# Patient Record
Sex: FEMALE | Race: BLACK OR AFRICAN AMERICAN | NOT HISPANIC OR LATINO
[De-identification: names, ages, dates, MRNs, and addresses within clinical notes are randomized per-mention and may not be internally consistent; named-entity substitution may affect disease eponyms.]

---

## 2017-09-26 ENCOUNTER — APPOINTMENT (OUTPATIENT)
Dept: MRI IMAGING | Facility: CLINIC | Age: 47
End: 2017-09-26

## 2017-09-30 ENCOUNTER — APPOINTMENT (OUTPATIENT)
Dept: MRI IMAGING | Facility: CLINIC | Age: 47
End: 2017-09-30

## 2017-09-30 ENCOUNTER — OUTPATIENT (OUTPATIENT)
Dept: OUTPATIENT SERVICES | Facility: HOSPITAL | Age: 47
LOS: 1 days | End: 2017-09-30
Payer: MEDICAID

## 2017-09-30 DIAGNOSIS — Z00.8 ENCOUNTER FOR OTHER GENERAL EXAMINATION: ICD-10-CM

## 2017-09-30 PROCEDURE — 73721 MRI JNT OF LWR EXTRE W/O DYE: CPT

## 2017-09-30 PROCEDURE — 73221 MRI JOINT UPR EXTREM W/O DYE: CPT | Mod: 26,LT

## 2017-09-30 PROCEDURE — 73221 MRI JOINT UPR EXTREM W/O DYE: CPT

## 2017-09-30 PROCEDURE — 73721 MRI JNT OF LWR EXTRE W/O DYE: CPT | Mod: 26,RT

## 2017-10-13 ENCOUNTER — OUTPATIENT (OUTPATIENT)
Dept: OUTPATIENT SERVICES | Facility: HOSPITAL | Age: 47
LOS: 1 days | End: 2017-10-13
Payer: MEDICAID

## 2017-10-13 DIAGNOSIS — M25.551 PAIN IN RIGHT HIP: ICD-10-CM

## 2017-10-13 PROCEDURE — 20610 DRAIN/INJ JOINT/BURSA W/O US: CPT | Mod: RT

## 2017-10-13 PROCEDURE — 77003 FLUOROGUIDE FOR SPINE INJECT: CPT

## 2018-03-20 ENCOUNTER — RESULT REVIEW (OUTPATIENT)
Age: 48
End: 2018-03-20

## 2018-04-11 ENCOUNTER — OTHER (OUTPATIENT)
Age: 48
End: 2018-04-11

## 2018-04-11 DIAGNOSIS — M25.559 PAIN IN UNSPECIFIED HIP: ICD-10-CM

## 2018-04-23 ENCOUNTER — APPOINTMENT (OUTPATIENT)
Dept: ORTHOPEDIC SURGERY | Facility: CLINIC | Age: 48
End: 2018-04-23
Payer: MEDICAID

## 2018-04-23 VITALS
SYSTOLIC BLOOD PRESSURE: 127 MMHG | WEIGHT: 140 LBS | HEART RATE: 61 BPM | HEIGHT: 66 IN | DIASTOLIC BLOOD PRESSURE: 86 MMHG | BODY MASS INDEX: 22.5 KG/M2

## 2018-04-23 PROCEDURE — 99204 OFFICE O/P NEW MOD 45 MIN: CPT

## 2018-04-23 PROCEDURE — 73502 X-RAY EXAM HIP UNI 2-3 VIEWS: CPT | Mod: RT

## 2018-08-13 ENCOUNTER — OTHER (OUTPATIENT)
Age: 48
End: 2018-08-13

## 2018-08-27 ENCOUNTER — APPOINTMENT (OUTPATIENT)
Dept: ORTHOPEDIC SURGERY | Facility: CLINIC | Age: 48
End: 2018-08-27
Payer: MEDICAID

## 2018-08-27 VITALS
BODY MASS INDEX: 22.5 KG/M2 | HEIGHT: 66 IN | SYSTOLIC BLOOD PRESSURE: 125 MMHG | DIASTOLIC BLOOD PRESSURE: 83 MMHG | HEART RATE: 64 BPM | WEIGHT: 140 LBS

## 2018-08-27 DIAGNOSIS — M70.71 OTHER BURSITIS OF HIP, RIGHT HIP: ICD-10-CM

## 2018-08-27 PROCEDURE — 99213 OFFICE O/P EST LOW 20 MIN: CPT

## 2018-08-27 PROCEDURE — 73502 X-RAY EXAM HIP UNI 2-3 VIEWS: CPT | Mod: RT

## 2018-11-09 ENCOUNTER — RECORD ABSTRACTING (OUTPATIENT)
Age: 48
End: 2018-11-09

## 2018-11-09 ENCOUNTER — APPOINTMENT (OUTPATIENT)
Dept: PHYSICAL MEDICINE AND REHAB | Facility: CLINIC | Age: 48
End: 2018-11-09
Payer: MEDICAID

## 2018-11-09 ENCOUNTER — APPOINTMENT (OUTPATIENT)
Dept: INTERNAL MEDICINE | Facility: CLINIC | Age: 48
End: 2018-11-09

## 2018-11-09 VITALS — BODY MASS INDEX: 22.5 KG/M2 | RESPIRATION RATE: 14 BRPM | WEIGHT: 140 LBS | HEIGHT: 66 IN | HEART RATE: 60 BPM

## 2018-11-09 PROBLEM — I87.8 VENOUS STASIS: Status: ACTIVE | Noted: 2018-11-09

## 2018-11-09 PROBLEM — I73.00 RAYNAUD'S PHENOMENON: Status: ACTIVE | Noted: 2018-11-09

## 2018-11-09 PROBLEM — M25.551 CHRONIC PAIN OF RIGHT HIP: Status: ACTIVE | Noted: 2018-11-09

## 2018-11-09 PROBLEM — R51 HEADACHE: Status: ACTIVE | Noted: 2018-11-09

## 2018-11-09 PROBLEM — M54.30 SCIATICA: Status: ACTIVE | Noted: 2018-11-09

## 2018-11-09 PROBLEM — M54.12 CERVICAL NEURALGIA: Status: RESOLVED | Noted: 2018-11-09 | Resolved: 2018-11-09

## 2018-11-09 PROCEDURE — 99204 OFFICE O/P NEW MOD 45 MIN: CPT

## 2018-11-14 ENCOUNTER — APPOINTMENT (OUTPATIENT)
Dept: INTERNAL MEDICINE | Facility: CLINIC | Age: 48
End: 2018-11-14
Payer: MEDICAID

## 2018-11-14 VITALS
SYSTOLIC BLOOD PRESSURE: 118 MMHG | DIASTOLIC BLOOD PRESSURE: 70 MMHG | HEIGHT: 66 IN | WEIGHT: 140 LBS | TEMPERATURE: 98 F | BODY MASS INDEX: 22.5 KG/M2

## 2018-11-14 DIAGNOSIS — M25.551 PAIN IN RIGHT HIP: ICD-10-CM

## 2018-11-14 DIAGNOSIS — T78.40XA ALLERGY, UNSPECIFIED, INITIAL ENCOUNTER: ICD-10-CM

## 2018-11-14 DIAGNOSIS — I87.8 OTHER SPECIFIED DISORDERS OF VEINS: ICD-10-CM

## 2018-11-14 DIAGNOSIS — M54.30 SCIATICA, UNSPECIFIED SIDE: ICD-10-CM

## 2018-11-14 DIAGNOSIS — R51 HEADACHE: ICD-10-CM

## 2018-11-14 DIAGNOSIS — G89.29 PAIN IN RIGHT HIP: ICD-10-CM

## 2018-11-14 DIAGNOSIS — I73.00 RAYNAUD'S SYNDROME W/OUT GANGRENE: ICD-10-CM

## 2018-11-14 DIAGNOSIS — M54.12 RADICULOPATHY, CERVICAL REGION: ICD-10-CM

## 2018-11-14 PROCEDURE — 99213 OFFICE O/P EST LOW 20 MIN: CPT

## 2018-11-14 NOTE — HISTORY OF PRESENT ILLNESS
[FreeTextEntry8] : Pt 47 y/o female present complaints of having rash all over her body for about a week.She went to urgent care and they gave her Allegra and hydroxyzine but she never picked up the pills. She reports itchy welts that appear and disappear on her arms and legs. She denies using any new products but does report increased stress. When she scratches her skin there is a welt there.

## 2018-11-14 NOTE — PLAN
[FreeTextEntry1] : She has evidence of urticaria and dermatographism. She was given Allegra and hydroxyzine. I encouraged her to  those pills and try them. If the Allegra is not covered by her insurance she can try Xyzal. Giving her a referral to an allergist.

## 2018-11-14 NOTE — PHYSICAL EXAM
[No Acute Distress] : no acute distress [Normal Sclera/Conjunctiva] : normal sclera/conjunctiva [Normal Outer Ear/Nose] : the outer ears and nose were normal in appearance [de-identified] : + dermatographism

## 2018-11-28 ENCOUNTER — MESSAGE (OUTPATIENT)
Age: 48
End: 2018-11-28

## 2018-12-14 ENCOUNTER — APPOINTMENT (OUTPATIENT)
Dept: PHYSICAL MEDICINE AND REHAB | Facility: CLINIC | Age: 48
End: 2018-12-14
Payer: MEDICAID

## 2018-12-14 VITALS
DIASTOLIC BLOOD PRESSURE: 88 MMHG | HEIGHT: 66 IN | SYSTOLIC BLOOD PRESSURE: 126 MMHG | BODY MASS INDEX: 22.5 KG/M2 | WEIGHT: 140 LBS

## 2018-12-14 DIAGNOSIS — M53.3 SACROCOCCYGEAL DISORDERS, NOT ELSEWHERE CLASSIFIED: ICD-10-CM

## 2018-12-14 DIAGNOSIS — M47.816 SPONDYLOSIS W/OUT MYELOPATHY OR RADICULOPATHY, LUMBAR REGION: ICD-10-CM

## 2018-12-14 PROCEDURE — 99214 OFFICE O/P EST MOD 30 MIN: CPT

## 2019-02-03 PROBLEM — T78.40XA ALLERGY: Status: ACTIVE | Noted: 2018-11-09

## 2019-03-12 ENCOUNTER — APPOINTMENT (OUTPATIENT)
Dept: INTERNAL MEDICINE | Facility: CLINIC | Age: 49
End: 2019-03-12
Payer: MEDICAID

## 2019-03-12 VITALS
HEIGHT: 65 IN | BODY MASS INDEX: 26.16 KG/M2 | WEIGHT: 157 LBS | TEMPERATURE: 98.3 F | SYSTOLIC BLOOD PRESSURE: 120 MMHG | DIASTOLIC BLOOD PRESSURE: 80 MMHG

## 2019-03-12 PROCEDURE — 99214 OFFICE O/P EST MOD 30 MIN: CPT

## 2019-03-12 RX ORDER — METHYLPREDNISOLONE 4 MG/1
4 TABLET ORAL
Qty: 1 | Refills: 0 | Status: COMPLETED | COMMUNITY
Start: 2018-12-14 | End: 2019-03-12

## 2019-03-12 RX ORDER — MELOXICAM 7.5 MG/1
7.5 TABLET ORAL TWICE DAILY
Qty: 60 | Refills: 0 | Status: COMPLETED | COMMUNITY
Start: 2018-04-23 | End: 2019-03-12

## 2019-03-12 NOTE — PLAN
[FreeTextEntry1] : Pt will follow a reflux diet and start medications above\par Discussed with her the need to start exercise to help with some of her stress. \par Follow up in 2 weeks if no improvement

## 2019-03-12 NOTE — REVIEW OF SYSTEMS
[Abdominal Pain] : no abdominal pain [Nausea] : nausea [Constipation] : no constipation [Diarrhea] : diarrhea [Vomiting] : no vomiting [Heartburn] : heartburn [Melena] : no melena

## 2019-03-12 NOTE — PHYSICAL EXAM
[Soft] : abdomen soft [Non Tender] : non-tender [Non-distended] : non-distended [No Masses] : no abdominal mass palpated [Normal Bowel Sounds] : normal bowel sounds [Normal Posterior Cervical Nodes] : no posterior cervical lymphadenopathy [Normal Anterior Cervical Nodes] : no anterior cervical lymphadenopathy [de-identified] : mild erythema oropharynx

## 2019-03-12 NOTE — HISTORY OF PRESENT ILLNESS
[FreeTextEntry8] : 47 y/o female present with acid reflux. \par Pt presents to the office today with exacerbation of reflux for the last 2-3 weeks.  She has improved diet and tried taking Nexium OTC for 8 days with no improvement\par No vomiting no bowel changes.\par She has been under increased stress with her son and his behavioral issues.

## 2019-04-08 ENCOUNTER — RX RENEWAL (OUTPATIENT)
Age: 49
End: 2019-04-08

## 2019-10-07 ENCOUNTER — NON-APPOINTMENT (OUTPATIENT)
Age: 49
End: 2019-10-07

## 2019-10-07 ENCOUNTER — APPOINTMENT (OUTPATIENT)
Dept: INTERNAL MEDICINE | Facility: CLINIC | Age: 49
End: 2019-10-07
Payer: MEDICAID

## 2019-10-07 VITALS
WEIGHT: 155 LBS | DIASTOLIC BLOOD PRESSURE: 80 MMHG | TEMPERATURE: 98.5 F | OXYGEN SATURATION: 98 % | HEIGHT: 65 IN | HEART RATE: 72 BPM | SYSTOLIC BLOOD PRESSURE: 120 MMHG | BODY MASS INDEX: 25.83 KG/M2

## 2019-10-07 PROCEDURE — 36415 COLL VENOUS BLD VENIPUNCTURE: CPT

## 2019-10-07 PROCEDURE — 93000 ELECTROCARDIOGRAM COMPLETE: CPT

## 2019-10-07 PROCEDURE — 99396 PREV VISIT EST AGE 40-64: CPT | Mod: 25

## 2019-10-07 RX ORDER — PANTOPRAZOLE 40 MG/1
40 TABLET, DELAYED RELEASE ORAL
Qty: 30 | Refills: 0 | Status: COMPLETED | COMMUNITY
Start: 2019-03-12 | End: 2019-10-07

## 2019-10-07 RX ORDER — BUTALBITAL, ACETAMINOPHEN AND CAFFEINE 325; 50; 40 MG/1; MG/1; MG/1
50-325-40 TABLET ORAL EVERY 4 HOURS
Refills: 0 | Status: COMPLETED | COMMUNITY
Start: 2018-11-09 | End: 2019-10-07

## 2019-10-07 RX ORDER — RANITIDINE HYDROCHLORIDE 300 MG/1
300 CAPSULE ORAL
Qty: 30 | Refills: 0 | Status: COMPLETED | COMMUNITY
Start: 2018-11-09 | End: 2019-10-07

## 2019-10-07 NOTE — HISTORY OF PRESENT ILLNESS
[FreeTextEntry1] : 48 y/o female present for a physical exam.  [de-identified] : Pt presents to the office today for CPE and FBW\par She feels well overall. \par She still suffers with back issues and she just recently started decompression therapy for her back to early to tell yet if its helping\par Her diet is good\par She suffers with intermittent reflux

## 2019-10-07 NOTE — PHYSICAL EXAM
[No Acute Distress] : no acute distress [Well Nourished] : well nourished [Well Developed] : well developed [Well-Appearing] : well-appearing [Normal Sclera/Conjunctiva] : normal sclera/conjunctiva [PERRL] : pupils equal round and reactive to light [EOMI] : extraocular movements intact [Normal Outer Ear/Nose] : the outer ears and nose were normal in appearance [Normal Oropharynx] : the oropharynx was normal [No JVD] : no jugular venous distention [No Lymphadenopathy] : no lymphadenopathy [Supple] : supple [Thyroid Normal, No Nodules] : the thyroid was normal and there were no nodules present [No Respiratory Distress] : no respiratory distress  [No Accessory Muscle Use] : no accessory muscle use [Clear to Auscultation] : lungs were clear to auscultation bilaterally [Normal Rate] : normal rate  [Regular Rhythm] : with a regular rhythm [Normal S1, S2] : normal S1 and S2 [No Murmur] : no murmur heard [No Carotid Bruits] : no carotid bruits [No Abdominal Bruit] : a ~M bruit was not heard ~T in the abdomen [No Varicosities] : no varicosities [Pedal Pulses Present] : the pedal pulses are present [No Edema] : there was no peripheral edema [No Palpable Aorta] : no palpable aorta [No Extremity Clubbing/Cyanosis] : no extremity clubbing/cyanosis [Soft] : abdomen soft [Non Tender] : non-tender [Non-distended] : non-distended [No Masses] : no abdominal mass palpated [No HSM] : no HSM [Normal Bowel Sounds] : normal bowel sounds [Normal Posterior Cervical Nodes] : no posterior cervical lymphadenopathy [Normal Anterior Cervical Nodes] : no anterior cervical lymphadenopathy [No CVA Tenderness] : no CVA  tenderness [No Spinal Tenderness] : no spinal tenderness [No Joint Swelling] : no joint swelling [Grossly Normal Strength/Tone] : grossly normal strength/tone [No Rash] : no rash [Coordination Grossly Intact] : coordination grossly intact [No Focal Deficits] : no focal deficits [Normal Gait] : normal gait [Deep Tendon Reflexes (DTR)] : deep tendon reflexes were 2+ and symmetric [Normal Affect] : the affect was normal [Normal Insight/Judgement] : insight and judgment were intact [de-identified] : BL trap spams and tightness

## 2019-10-07 NOTE — PLAN
[FreeTextEntry1] : Discussed reflux diet with pt.  Pt will elevated herself when sleeping with extra pillow.\par start ranitidine at bedtime\par She will continue to improve diet and exercise as tolerated with back pain\par She will continue with her back therapy and keep me posted how she is doing\par She is due for Mammo and rx was give to pt.\par FBW done in office today\par EKG done NSR @ 64 BPM

## 2019-10-08 ENCOUNTER — RESULT REVIEW (OUTPATIENT)
Age: 49
End: 2019-10-08

## 2019-10-08 LAB
25(OH)D3 SERPL-MCNC: 23.7 NG/ML
ALBUMIN SERPL ELPH-MCNC: 4.1 G/DL
ALP BLD-CCNC: 63 U/L
ALT SERPL-CCNC: 14 U/L
ANION GAP SERPL CALC-SCNC: 9 MMOL/L
APPEARANCE: CLEAR
AST SERPL-CCNC: 15 U/L
BACTERIA: NEGATIVE
BASOPHILS # BLD AUTO: 0.01 K/UL
BASOPHILS NFR BLD AUTO: 0.2 %
BILIRUB SERPL-MCNC: 0.5 MG/DL
BILIRUBIN URINE: NEGATIVE
BLOOD URINE: NEGATIVE
BUN SERPL-MCNC: 11 MG/DL
CALCIUM SERPL-MCNC: 9 MG/DL
CHLORIDE SERPL-SCNC: 105 MMOL/L
CHOLEST SERPL-MCNC: 168 MG/DL
CHOLEST/HDLC SERPL: 2.6 RATIO
CO2 SERPL-SCNC: 24 MMOL/L
COLOR: YELLOW
CREAT SERPL-MCNC: 0.98 MG/DL
EOSINOPHIL # BLD AUTO: 0.08 K/UL
EOSINOPHIL NFR BLD AUTO: 1.8 %
ESTIMATED AVERAGE GLUCOSE: 108 MG/DL
GLUCOSE QUALITATIVE U: NEGATIVE
GLUCOSE SERPL-MCNC: 91 MG/DL
HBA1C MFR BLD HPLC: 5.4 %
HCT VFR BLD CALC: 40.7 %
HDLC SERPL-MCNC: 65 MG/DL
HGB BLD-MCNC: 13.1 G/DL
HYALINE CASTS: 2 /LPF
IMM GRANULOCYTES NFR BLD AUTO: 0 %
KETONES URINE: NEGATIVE
LDLC SERPL CALC-MCNC: 93 MG/DL
LEUKOCYTE ESTERASE URINE: NEGATIVE
LYMPHOCYTES # BLD AUTO: 2.04 K/UL
LYMPHOCYTES NFR BLD AUTO: 45.9 %
MAN DIFF?: NORMAL
MCHC RBC-ENTMCNC: 30.4 PG
MCHC RBC-ENTMCNC: 32.2 GM/DL
MCV RBC AUTO: 94.4 FL
MICROSCOPIC-UA: NORMAL
MONOCYTES # BLD AUTO: 0.38 K/UL
MONOCYTES NFR BLD AUTO: 8.6 %
NEUTROPHILS # BLD AUTO: 1.93 K/UL
NEUTROPHILS NFR BLD AUTO: 43.5 %
NITRITE URINE: NEGATIVE
PH URINE: 6
PLATELET # BLD AUTO: 279 K/UL
POTASSIUM SERPL-SCNC: 4.3 MMOL/L
PROT SERPL-MCNC: 7.5 G/DL
PROTEIN URINE: NORMAL
RBC # BLD: 4.31 M/UL
RBC # FLD: 13.6 %
RED BLOOD CELLS URINE: 2 /HPF
SODIUM SERPL-SCNC: 137 MMOL/L
SPECIFIC GRAVITY URINE: 1.03
SQUAMOUS EPITHELIAL CELLS: 4 /HPF
TRIGL SERPL-MCNC: 49 MG/DL
TSH SERPL-ACNC: 1.63 UIU/ML
UROBILINOGEN URINE: NORMAL
VIT B12 SERPL-MCNC: 215 PG/ML
WBC # FLD AUTO: 4.44 K/UL
WHITE BLOOD CELLS URINE: 2 /HPF

## 2019-10-10 ENCOUNTER — APPOINTMENT (OUTPATIENT)
Dept: INTERNAL MEDICINE | Facility: CLINIC | Age: 49
End: 2019-10-10
Payer: MEDICAID

## 2019-10-10 PROCEDURE — 96372 THER/PROPH/DIAG INJ SC/IM: CPT

## 2019-10-10 RX ORDER — CYANOCOBALAMIN 1000 UG/ML
1000 INJECTION INTRAMUSCULAR; SUBCUTANEOUS
Qty: 0 | Refills: 0 | Status: COMPLETED | OUTPATIENT
Start: 2019-10-10

## 2019-10-10 RX ADMIN — CYANOCOBALAMIN 0 MCG/ML: 1000 INJECTION INTRAMUSCULAR; SUBCUTANEOUS at 00:00

## 2019-10-17 ENCOUNTER — APPOINTMENT (OUTPATIENT)
Dept: INTERNAL MEDICINE | Facility: CLINIC | Age: 49
End: 2019-10-17
Payer: MEDICAID

## 2019-10-17 ENCOUNTER — MED ADMIN CHARGE (OUTPATIENT)
Age: 49
End: 2019-10-17

## 2019-10-17 PROCEDURE — 96372 THER/PROPH/DIAG INJ SC/IM: CPT

## 2019-10-17 RX ORDER — CYANOCOBALAMIN 1000 UG/ML
1000 INJECTION INTRAMUSCULAR; SUBCUTANEOUS
Qty: 0 | Refills: 0 | Status: COMPLETED | OUTPATIENT
Start: 2019-10-17

## 2019-10-17 RX ADMIN — CYANOCOBALAMIN 0 MCG/ML: 1000 INJECTION INTRAMUSCULAR; SUBCUTANEOUS at 00:00

## 2019-10-24 ENCOUNTER — APPOINTMENT (OUTPATIENT)
Dept: INTERNAL MEDICINE | Facility: CLINIC | Age: 49
End: 2019-10-24
Payer: MEDICAID

## 2019-10-24 PROCEDURE — 96372 THER/PROPH/DIAG INJ SC/IM: CPT

## 2019-10-24 RX ORDER — CYANOCOBALAMIN 1000 UG/ML
1000 INJECTION INTRAMUSCULAR; SUBCUTANEOUS
Qty: 0 | Refills: 0 | Status: COMPLETED | OUTPATIENT
Start: 2019-10-24

## 2019-10-24 RX ADMIN — CYANOCOBALAMIN 0 MCG/ML: 1000 INJECTION INTRAMUSCULAR; SUBCUTANEOUS at 00:00

## 2019-10-31 ENCOUNTER — APPOINTMENT (OUTPATIENT)
Dept: INTERNAL MEDICINE | Facility: CLINIC | Age: 49
End: 2019-10-31
Payer: MEDICAID

## 2019-10-31 PROCEDURE — 36415 COLL VENOUS BLD VENIPUNCTURE: CPT

## 2019-11-01 ENCOUNTER — RECORD ABSTRACTING (OUTPATIENT)
Age: 49
End: 2019-11-01

## 2019-11-01 ENCOUNTER — RESULT REVIEW (OUTPATIENT)
Age: 49
End: 2019-11-01

## 2019-11-01 LAB — VIT B12 SERPL-MCNC: 732 PG/ML

## 2020-02-27 ENCOUNTER — APPOINTMENT (OUTPATIENT)
Dept: INTERNAL MEDICINE | Facility: CLINIC | Age: 50
End: 2020-02-27
Payer: MEDICAID

## 2020-02-27 VITALS
WEIGHT: 157 LBS | TEMPERATURE: 98.5 F | HEIGHT: 65 IN | HEART RATE: 69 BPM | BODY MASS INDEX: 26.16 KG/M2 | DIASTOLIC BLOOD PRESSURE: 80 MMHG | SYSTOLIC BLOOD PRESSURE: 120 MMHG | OXYGEN SATURATION: 97 %

## 2020-02-27 PROCEDURE — 99214 OFFICE O/P EST MOD 30 MIN: CPT

## 2020-02-27 NOTE — PLAN
[FreeTextEntry1] : Discussed reflux diet with pt.\par With out following a relux diet medications may not help\par Endoscopy is need and strict reflux diet along with medications

## 2020-02-27 NOTE — PHYSICAL EXAM
[Soft] : abdomen soft [de-identified] : Epigastric tenderness  [No Masses] : no abdominal mass palpated [Normal] : no posterior cervical lymphadenopathy and no anterior cervical lymphadenopathy

## 2020-02-27 NOTE — HISTORY OF PRESENT ILLNESS
[FreeTextEntry8] : 50 y/o female present with acid reflux, pt. states the meds are not helping her for the last few days\par Pt is drinking her coffee in the office.\par She has not been following a reflux diet

## 2020-02-28 RX ORDER — SUCRALFATE 1 G/10ML
1 SUSPENSION ORAL 3 TIMES DAILY
Qty: 1 | Refills: 0 | Status: DISCONTINUED | COMMUNITY
Start: 2020-02-27 | End: 2020-02-28

## 2020-04-06 ENCOUNTER — APPOINTMENT (OUTPATIENT)
Dept: GASTROENTEROLOGY | Facility: CLINIC | Age: 50
End: 2020-04-06

## 2020-04-09 PROBLEM — M25.559 HIP PAIN: Status: ACTIVE | Noted: 2018-04-11

## 2020-05-06 VITALS — BODY MASS INDEX: 25.33 KG/M2 | WEIGHT: 152 LBS | HEIGHT: 65 IN

## 2020-05-06 RX ORDER — RANITIDINE HYDROCHLORIDE 150 MG/1
150 CAPSULE ORAL TWICE DAILY
Qty: 60 | Refills: 0 | Status: DISCONTINUED | COMMUNITY
Start: 2019-03-12 | End: 2020-05-06

## 2020-05-06 RX ORDER — SUCRALFATE 1 G/10ML
1 SUSPENSION ORAL 3 TIMES DAILY
Qty: 1 | Refills: 0 | Status: DISCONTINUED | COMMUNITY
Start: 2020-02-28 | End: 2020-05-06

## 2020-05-06 RX ORDER — ADHESIVE TAPE 3"X 2.3 YD
50 MCG TAPE, NON-MEDICATED TOPICAL
Refills: 0 | Status: DISCONTINUED | COMMUNITY
Start: 2018-11-09 | End: 2020-05-06

## 2020-05-08 ENCOUNTER — APPOINTMENT (OUTPATIENT)
Dept: GASTROENTEROLOGY | Facility: HOSPITAL | Age: 50
End: 2020-05-08
Payer: MEDICAID

## 2020-05-08 PROCEDURE — 99203 OFFICE O/P NEW LOW 30 MIN: CPT | Mod: 95

## 2020-05-08 NOTE — HISTORY OF PRESENT ILLNESS
[de-identified] : 50 y/o American woman who presents with chief complaint of having acid reflux. The patient reports for many years she's been having acid reflux where she experiences regurgitation and burning in her throat. She has had hoarseness of voice.   At times she feels food get stuck in her chest. She is nauseous at times.  At times to feel better she will induce vomiting. \par \par She has tried acid suppressive therapy such as Nexium which have helped but it is temporary.\par \par She feels bloated and gassy. She has lost her appetite and lost about maybe 5 pounds since the past 5 months.\par \par Lately she also reports having constipation. She has a bowel movement every other day or every 3 days. Her stools can be hard. She denies any melena or hematochezia.\par \par She's never had an upper endoscopy or colonoscopy before.\par \par She denies any digestive cancers in her family.\par \par All other review of systems are negative.  Denies cardiac symptoms.\par \par

## 2020-05-08 NOTE — REASON FOR VISIT
[Home] : at home, [unfilled] , at the time of the visit. [Medical Office: ([facility])___] : at the medical office located in  [Patient] : the patient [FreeTextEntry2] : Itzel Wells

## 2020-05-08 NOTE — ASSESSMENT
[FreeTextEntry1] : IMPRESSION\par \par 1.  Acid reflux\par 2.  Dysphagia\par 3.  Loss of appetite, weight loss\par 4.  Bloating\par 5.  Constipation\par 6.  \par \par \par PLAN: \par 1.  I have advised her to schedule for an upper endoscopy under monitored anesthesia care to rule out esophagitis, stricture, lesion, hiatal hernia, peptic ulcer disease, H.pylori gastritis etc.   Risks, benefits, and alternatives of the procedure were discussed with the patient. Patient understands and agrees to schedule procedure.\par 2.  Continue PPI therapy for now - hold off 2 weeks prior to EGD.  Avoid zantac for now.\par 3.  I have advised the patient to arrange for a colonoscopy to rule out colon polyps, colorectal cancer etc. under monitored anesthesia care.  Risks such as perforation requiring surgery, bleeding, infection, diverticulitis, colitis, missed colon cancer (2% to 6%), internal organ injury, etc, risks of bowel prep including colitis, syncope, adverse reaction to medication etc. and risks of anesthesia including cardiopulmonary compromise were discussed with patient.  Patient verbalized understanding and agrees to schedule for planned procedure.\par 4.  Metamucil once a day with increased fluid hydration. \par \par \par

## 2020-05-18 ENCOUNTER — APPOINTMENT (OUTPATIENT)
Dept: GASTROENTEROLOGY | Facility: CLINIC | Age: 50
End: 2020-05-18

## 2020-06-15 ENCOUNTER — APPOINTMENT (OUTPATIENT)
Dept: DISASTER EMERGENCY | Facility: CLINIC | Age: 50
End: 2020-06-15

## 2020-06-16 LAB — SARS-COV-2 N GENE NPH QL NAA+PROBE: NOT DETECTED

## 2020-06-17 ENCOUNTER — TRANSCRIPTION ENCOUNTER (OUTPATIENT)
Age: 50
End: 2020-06-17

## 2020-06-18 ENCOUNTER — RESULT REVIEW (OUTPATIENT)
Age: 50
End: 2020-06-18

## 2020-06-18 ENCOUNTER — OUTPATIENT (OUTPATIENT)
Dept: OUTPATIENT SERVICES | Facility: HOSPITAL | Age: 50
LOS: 1 days | End: 2020-06-18
Payer: MEDICAID

## 2020-06-18 ENCOUNTER — APPOINTMENT (OUTPATIENT)
Dept: GASTROENTEROLOGY | Facility: HOSPITAL | Age: 50
End: 2020-06-18

## 2020-06-18 DIAGNOSIS — K21.9 GASTRO-ESOPHAGEAL REFLUX DISEASE WITHOUT ESOPHAGITIS: ICD-10-CM

## 2020-06-18 DIAGNOSIS — R13.10 DYSPHAGIA, UNSPECIFIED: ICD-10-CM

## 2020-06-18 DIAGNOSIS — K59.09 OTHER CONSTIPATION: ICD-10-CM

## 2020-06-18 LAB — HCG UR QL: NEGATIVE — SIGNIFICANT CHANGE UP

## 2020-06-18 PROCEDURE — 88312 SPECIAL STAINS GROUP 1: CPT

## 2020-06-18 PROCEDURE — 45385 COLONOSCOPY W/LESION REMOVAL: CPT

## 2020-06-18 PROCEDURE — 81025 URINE PREGNANCY TEST: CPT

## 2020-06-18 PROCEDURE — 43239 EGD BIOPSY SINGLE/MULTIPLE: CPT

## 2020-06-18 PROCEDURE — 88305 TISSUE EXAM BY PATHOLOGIST: CPT | Mod: 26

## 2020-06-18 PROCEDURE — 88305 TISSUE EXAM BY PATHOLOGIST: CPT

## 2020-06-18 PROCEDURE — 88312 SPECIAL STAINS GROUP 1: CPT | Mod: 26

## 2020-06-18 PROCEDURE — 88313 SPECIAL STAINS GROUP 2: CPT

## 2020-06-18 PROCEDURE — 88313 SPECIAL STAINS GROUP 2: CPT | Mod: 26

## 2020-06-18 PROCEDURE — C1889: CPT

## 2020-07-17 ENCOUNTER — APPOINTMENT (OUTPATIENT)
Dept: GASTROENTEROLOGY | Facility: CLINIC | Age: 50
End: 2020-07-17

## 2020-08-07 ENCOUNTER — APPOINTMENT (OUTPATIENT)
Dept: GASTROENTEROLOGY | Facility: CLINIC | Age: 50
End: 2020-08-07

## 2020-08-10 LAB — UREA BREATH TEST QL: NEGATIVE

## 2020-10-16 ENCOUNTER — NON-APPOINTMENT (OUTPATIENT)
Age: 50
End: 2020-10-16

## 2020-10-16 ENCOUNTER — APPOINTMENT (OUTPATIENT)
Dept: INTERNAL MEDICINE | Facility: CLINIC | Age: 50
End: 2020-10-16
Payer: MEDICAID

## 2020-10-16 VITALS
OXYGEN SATURATION: 99 % | SYSTOLIC BLOOD PRESSURE: 130 MMHG | BODY MASS INDEX: 25.99 KG/M2 | HEART RATE: 68 BPM | TEMPERATURE: 98.4 F | DIASTOLIC BLOOD PRESSURE: 80 MMHG | WEIGHT: 156 LBS | HEIGHT: 65 IN

## 2020-10-16 DIAGNOSIS — A04.8 OTHER SPECIFIED BACTERIAL INTESTINAL INFECTIONS: ICD-10-CM

## 2020-10-16 DIAGNOSIS — M62.838 OTHER MUSCLE SPASM: ICD-10-CM

## 2020-10-16 DIAGNOSIS — R94.31 ABNORMAL ELECTROCARDIOGRAM [ECG] [EKG]: ICD-10-CM

## 2020-10-16 DIAGNOSIS — R53.83 OTHER FATIGUE: ICD-10-CM

## 2020-10-16 PROCEDURE — G0008: CPT

## 2020-10-16 PROCEDURE — 36415 COLL VENOUS BLD VENIPUNCTURE: CPT

## 2020-10-16 PROCEDURE — 90686 IIV4 VACC NO PRSV 0.5 ML IM: CPT

## 2020-10-16 PROCEDURE — 99396 PREV VISIT EST AGE 40-64: CPT | Mod: 25

## 2020-10-16 RX ORDER — SUCRALFATE 1 G/10ML
1 SUSPENSION ORAL
Refills: 0 | Status: COMPLETED | COMMUNITY
End: 2020-10-16

## 2020-10-16 RX ORDER — RANITIDINE 150 MG/1
150 TABLET ORAL
Refills: 0 | Status: COMPLETED | COMMUNITY
End: 2020-10-16

## 2020-10-16 RX ORDER — CLARITHROMYCIN 500 MG/1
500 TABLET, FILM COATED ORAL TWICE DAILY
Qty: 28 | Refills: 0 | Status: COMPLETED | COMMUNITY
Start: 2020-06-24 | End: 2020-10-16

## 2020-10-16 RX ORDER — AMOXICILLIN 500 MG/1
500 TABLET, FILM COATED ORAL TWICE DAILY
Qty: 56 | Refills: 0 | Status: COMPLETED | COMMUNITY
Start: 2020-06-24 | End: 2020-10-16

## 2020-10-16 RX ORDER — FAMOTIDINE 40 MG/1
40 TABLET, FILM COATED ORAL
Qty: 30 | Refills: 0 | Status: COMPLETED | COMMUNITY
Start: 2020-07-07 | End: 2020-10-16

## 2020-10-16 RX ORDER — OMEPRAZOLE 40 MG/1
40 CAPSULE, DELAYED RELEASE ORAL
Qty: 30 | Refills: 3 | Status: COMPLETED | COMMUNITY
Start: 2020-06-18 | End: 2020-10-16

## 2020-10-16 RX ORDER — PANTOPRAZOLE 40 MG/1
40 TABLET, DELAYED RELEASE ORAL TWICE DAILY
Qty: 28 | Refills: 0 | Status: COMPLETED | COMMUNITY
Start: 2020-06-24 | End: 2020-10-16

## 2020-10-16 RX ORDER — METHYLPREDNISOLONE 4 MG/1
4 TABLET ORAL
Refills: 0 | Status: COMPLETED | COMMUNITY
End: 2020-10-16

## 2020-10-16 RX ORDER — ESOMEPRAZOLE MAGNESIUM 5 MG/1
GRANULE, DELAYED RELEASE ORAL
Refills: 0 | Status: COMPLETED | COMMUNITY
End: 2020-10-16

## 2020-10-16 NOTE — PLAN
[FreeTextEntry1] : Possible left atrial enlargement and prior infarct.  I would rather pt see cardiology for consult and echo vs going to a radiology center for this. Pt will make appointment with cardiology \par FBW done in office today\par Pt was given influenza vaccination today.  Tolerated vaccination well with no acute reactions. Discussed vaccination with pt and answered all questions.\par Pt will make appointment with pain management for possible epidural inj

## 2020-10-16 NOTE — HISTORY OF PRESENT ILLNESS
[de-identified] : Pt presents to the office today for CPE and FBW\par She has been with chronic back pain and has tried therapy with no improvement.   She has a hx of lumbar disc budges \par Diet has been doing better and she has been following a reflux diet

## 2020-10-16 NOTE — PHYSICAL EXAM
[Normal] : affect was normal and insight and judgment were intact [de-identified] : Lower back pain

## 2020-10-21 LAB
25(OH)D3 SERPL-MCNC: 47.8 NG/ML
ALBUMIN SERPL ELPH-MCNC: 3.9 G/DL
ALP BLD-CCNC: 60 U/L
ALT SERPL-CCNC: 9 U/L
ANION GAP SERPL CALC-SCNC: 9 MMOL/L
APPEARANCE: CLEAR
AST SERPL-CCNC: 18 U/L
BACTERIA: NEGATIVE
BASOPHILS # BLD AUTO: 0.01 K/UL
BASOPHILS NFR BLD AUTO: 0.2 %
BILIRUB SERPL-MCNC: 0.3 MG/DL
BILIRUBIN URINE: NEGATIVE
BLOOD URINE: NEGATIVE
BUN SERPL-MCNC: 6 MG/DL
CALCIUM SERPL-MCNC: 8.6 MG/DL
CHLORIDE SERPL-SCNC: 106 MMOL/L
CHOLEST SERPL-MCNC: 193 MG/DL
CHOLEST/HDLC SERPL: 2.3 RATIO
CO2 SERPL-SCNC: 23 MMOL/L
COLOR: NORMAL
CREAT SERPL-MCNC: 0.9 MG/DL
EOSINOPHIL # BLD AUTO: 0.07 K/UL
EOSINOPHIL NFR BLD AUTO: 1.7 %
ESTIMATED AVERAGE GLUCOSE: 111 MG/DL
FERRITIN SERPL-MCNC: 40 NG/ML
GLUCOSE QUALITATIVE U: NEGATIVE
GLUCOSE SERPL-MCNC: 92 MG/DL
HBA1C MFR BLD HPLC: 5.5 %
HCT VFR BLD CALC: 40 %
HDLC SERPL-MCNC: 85 MG/DL
HGB BLD-MCNC: 13 G/DL
HYALINE CASTS: 0 /LPF
IMM GRANULOCYTES NFR BLD AUTO: 0.2 %
IRON SATN MFR SERPL: 26 %
IRON SERPL-MCNC: 74 UG/DL
KETONES URINE: NEGATIVE
LDLC SERPL CALC-MCNC: 98 MG/DL
LEUKOCYTE ESTERASE URINE: NEGATIVE
LYMPHOCYTES # BLD AUTO: 2.4 K/UL
LYMPHOCYTES NFR BLD AUTO: 57.8 %
MAN DIFF?: NORMAL
MCHC RBC-ENTMCNC: 30.6 PG
MCHC RBC-ENTMCNC: 32.5 GM/DL
MCV RBC AUTO: 94.1 FL
MICROSCOPIC-UA: NORMAL
MONOCYTES # BLD AUTO: 0.37 K/UL
MONOCYTES NFR BLD AUTO: 8.9 %
NEUTROPHILS # BLD AUTO: 1.29 K/UL
NEUTROPHILS NFR BLD AUTO: 31.2 %
NITRITE URINE: NEGATIVE
PH URINE: 8
PLATELET # BLD AUTO: 276 K/UL
POTASSIUM SERPL-SCNC: 4.5 MMOL/L
PROT SERPL-MCNC: 7.5 G/DL
PROTEIN URINE: NEGATIVE
RBC # BLD: 4.25 M/UL
RBC # FLD: 13.9 %
RED BLOOD CELLS URINE: 2 /HPF
SODIUM SERPL-SCNC: 137 MMOL/L
SPECIFIC GRAVITY URINE: 1.01
SQUAMOUS EPITHELIAL CELLS: 3 /HPF
TIBC SERPL-MCNC: 286 UG/DL
TRIGL SERPL-MCNC: 48 MG/DL
TSH SERPL-ACNC: 0.99 UIU/ML
UIBC SERPL-MCNC: 212 UG/DL
UROBILINOGEN URINE: NORMAL
VIT B12 SERPL-MCNC: 249 PG/ML
WBC # FLD AUTO: 4.15 K/UL
WHITE BLOOD CELLS URINE: 0 /HPF

## 2020-11-02 ENCOUNTER — NON-APPOINTMENT (OUTPATIENT)
Age: 50
End: 2020-11-02

## 2020-11-03 ENCOUNTER — NON-APPOINTMENT (OUTPATIENT)
Age: 50
End: 2020-11-03

## 2020-11-03 DIAGNOSIS — K21.9 GASTRO-ESOPHAGEAL REFLUX DISEASE W/OUT ESOPHAGITIS: ICD-10-CM

## 2020-11-10 ENCOUNTER — RX RENEWAL (OUTPATIENT)
Age: 50
End: 2020-11-10

## 2020-11-16 ENCOUNTER — APPOINTMENT (OUTPATIENT)
Dept: INTERNAL MEDICINE | Facility: CLINIC | Age: 50
End: 2020-11-16
Payer: MEDICAID

## 2020-11-16 VITALS
OXYGEN SATURATION: 99 % | HEART RATE: 79 BPM | BODY MASS INDEX: 25.23 KG/M2 | TEMPERATURE: 98.4 F | SYSTOLIC BLOOD PRESSURE: 120 MMHG | WEIGHT: 157 LBS | HEIGHT: 66 IN | DIASTOLIC BLOOD PRESSURE: 80 MMHG

## 2020-11-16 VITALS — SYSTOLIC BLOOD PRESSURE: 120 MMHG | DIASTOLIC BLOOD PRESSURE: 80 MMHG

## 2020-11-16 LAB
BILIRUB UR QL STRIP: NEGATIVE
CLARITY UR: CLEAR
COLLECTION METHOD: NORMAL
GLUCOSE UR-MCNC: NEGATIVE
HCG UR QL: 0.2 EU/DL
HGB UR QL STRIP.AUTO: NORMAL
KETONES UR-MCNC: NEGATIVE
LEUKOCYTE ESTERASE UR QL STRIP: NORMAL
NITRITE UR QL STRIP: NEGATIVE
PH UR STRIP: 5.5
PROT UR STRIP-MCNC: NEGATIVE
SP GR UR STRIP: 1.03

## 2020-11-16 PROCEDURE — 81003 URINALYSIS AUTO W/O SCOPE: CPT | Mod: QW

## 2020-11-16 PROCEDURE — 99072 ADDL SUPL MATRL&STAF TM PHE: CPT

## 2020-11-16 PROCEDURE — 99214 OFFICE O/P EST MOD 30 MIN: CPT | Mod: 25

## 2020-11-16 NOTE — REVIEW OF SYSTEMS
[Dysuria] : dysuria [Negative] : Constitutional [Incontinence] : no incontinence [Nocturia] : no nocturia [Hematuria] : no hematuria [Frequency] : no frequency [Vaginal Discharge] : no vaginal discharge

## 2020-11-16 NOTE — HISTORY OF PRESENT ILLNESS
[FreeTextEntry8] : 49 y/o female present today with a possible UTI, pt states that shes been drinking cranberry juice \par She has been with intermittent burning with urination for the last 2 weeks.\par No flank pain no D/C no vaginal itch

## 2020-11-16 NOTE — PLAN
[FreeTextEntry1] : UA shows small blood and leuks.  With this and having dysuria pt will be started on Macrobid and will be advised of results once reviewed\par

## 2020-11-18 LAB — BACTERIA UR CULT: NORMAL

## 2020-11-19 ENCOUNTER — NON-APPOINTMENT (OUTPATIENT)
Age: 50
End: 2020-11-19

## 2020-11-24 DIAGNOSIS — Z83.3 FAMILY HISTORY OF DIABETES MELLITUS: ICD-10-CM

## 2020-11-24 DIAGNOSIS — Z82.49 FAMILY HISTORY OF ISCHEMIC HEART DISEASE AND OTHER DISEASES OF THE CIRCULATORY SYSTEM: ICD-10-CM

## 2020-11-24 RX ORDER — SODIUM SULFATE, POTASSIUM SULFATE, MAGNESIUM SULFATE 17.5; 3.13; 1.6 G/ML; G/ML; G/ML
17.5-3.13-1.6 SOLUTION, CONCENTRATE ORAL
Qty: 1 | Refills: 0 | Status: DISCONTINUED | COMMUNITY
Start: 2020-05-08 | End: 2020-11-24

## 2020-11-24 RX ORDER — PANTOPRAZOLE 40 MG/1
40 TABLET, DELAYED RELEASE ORAL TWICE DAILY
Qty: 60 | Refills: 3 | Status: DISCONTINUED | COMMUNITY
Start: 2020-11-03 | End: 2020-11-24

## 2020-11-24 RX ORDER — NITROFURANTOIN (MONOHYDRATE/MACROCRYSTALS) 25; 75 MG/1; MG/1
100 CAPSULE ORAL TWICE DAILY
Qty: 14 | Refills: 0 | Status: DISCONTINUED | COMMUNITY
Start: 2020-11-16 | End: 2020-11-24

## 2020-11-30 ENCOUNTER — NON-APPOINTMENT (OUTPATIENT)
Age: 50
End: 2020-11-30

## 2020-11-30 ENCOUNTER — APPOINTMENT (OUTPATIENT)
Dept: CARDIOLOGY | Facility: CLINIC | Age: 50
End: 2020-11-30
Payer: MEDICAID

## 2020-11-30 VITALS
SYSTOLIC BLOOD PRESSURE: 120 MMHG | WEIGHT: 157 LBS | HEIGHT: 66 IN | HEART RATE: 63 BPM | DIASTOLIC BLOOD PRESSURE: 82 MMHG | BODY MASS INDEX: 25.23 KG/M2 | OXYGEN SATURATION: 98 %

## 2020-11-30 PROCEDURE — 99072 ADDL SUPL MATRL&STAF TM PHE: CPT

## 2020-11-30 PROCEDURE — 93000 ELECTROCARDIOGRAM COMPLETE: CPT

## 2020-11-30 PROCEDURE — 99204 OFFICE O/P NEW MOD 45 MIN: CPT

## 2020-12-08 ENCOUNTER — APPOINTMENT (OUTPATIENT)
Dept: DISASTER EMERGENCY | Facility: CLINIC | Age: 50
End: 2020-12-08

## 2020-12-09 DIAGNOSIS — D12.6 BENIGN NEOPLASM OF COLON, UNSPECIFIED: ICD-10-CM

## 2020-12-10 ENCOUNTER — TRANSCRIPTION ENCOUNTER (OUTPATIENT)
Age: 50
End: 2020-12-10

## 2020-12-10 LAB — SARS-COV-2 N GENE NPH QL NAA+PROBE: NOT DETECTED

## 2020-12-11 ENCOUNTER — OUTPATIENT (OUTPATIENT)
Dept: OUTPATIENT SERVICES | Facility: HOSPITAL | Age: 50
LOS: 1 days | End: 2020-12-11
Payer: MEDICAID

## 2020-12-11 ENCOUNTER — APPOINTMENT (OUTPATIENT)
Dept: GASTROENTEROLOGY | Facility: HOSPITAL | Age: 50
End: 2020-12-11

## 2020-12-11 DIAGNOSIS — Z12.11 ENCOUNTER FOR SCREENING FOR MALIGNANT NEOPLASM OF COLON: ICD-10-CM

## 2020-12-11 LAB — HCG UR QL: NEGATIVE — SIGNIFICANT CHANGE UP

## 2020-12-11 PROCEDURE — 45378 DIAGNOSTIC COLONOSCOPY: CPT

## 2020-12-11 PROCEDURE — 81025 URINE PREGNANCY TEST: CPT

## 2020-12-15 ENCOUNTER — NON-APPOINTMENT (OUTPATIENT)
Age: 50
End: 2020-12-15

## 2020-12-18 ENCOUNTER — RX RENEWAL (OUTPATIENT)
Age: 50
End: 2020-12-18

## 2020-12-21 ENCOUNTER — APPOINTMENT (OUTPATIENT)
Dept: CARDIOLOGY | Facility: CLINIC | Age: 50
End: 2020-12-21
Payer: MEDICAID

## 2020-12-21 PROCEDURE — 99072 ADDL SUPL MATRL&STAF TM PHE: CPT

## 2020-12-21 PROCEDURE — 93306 TTE W/DOPPLER COMPLETE: CPT

## 2020-12-22 ENCOUNTER — NON-APPOINTMENT (OUTPATIENT)
Age: 50
End: 2020-12-22

## 2020-12-23 ENCOUNTER — OUTPATIENT (OUTPATIENT)
Dept: OUTPATIENT SERVICES | Facility: HOSPITAL | Age: 50
LOS: 1 days | End: 2020-12-23
Payer: MEDICAID

## 2020-12-23 ENCOUNTER — APPOINTMENT (OUTPATIENT)
Dept: CT IMAGING | Facility: CLINIC | Age: 50
End: 2020-12-23
Payer: MEDICAID

## 2020-12-23 DIAGNOSIS — I71.00 DISSECTION OF UNSPECIFIED SITE OF AORTA: ICD-10-CM

## 2020-12-23 PROCEDURE — 71275 CT ANGIOGRAPHY CHEST: CPT | Mod: 26

## 2020-12-23 PROCEDURE — 71275 CT ANGIOGRAPHY CHEST: CPT

## 2021-01-05 ENCOUNTER — APPOINTMENT (OUTPATIENT)
Dept: CT IMAGING | Facility: CLINIC | Age: 51
End: 2021-01-05

## 2021-01-14 ENCOUNTER — OUTPATIENT (OUTPATIENT)
Dept: OUTPATIENT SERVICES | Facility: HOSPITAL | Age: 51
LOS: 1 days | End: 2021-01-14
Payer: MEDICAID

## 2021-01-14 ENCOUNTER — APPOINTMENT (OUTPATIENT)
Dept: INTERNAL MEDICINE | Facility: CLINIC | Age: 51
End: 2021-01-14
Payer: MEDICAID

## 2021-01-14 ENCOUNTER — APPOINTMENT (OUTPATIENT)
Dept: CT IMAGING | Facility: CLINIC | Age: 51
End: 2021-01-14
Payer: MEDICAID

## 2021-01-14 VITALS
DIASTOLIC BLOOD PRESSURE: 78 MMHG | OXYGEN SATURATION: 96 % | HEART RATE: 77 BPM | HEIGHT: 66 IN | BODY MASS INDEX: 25.23 KG/M2 | SYSTOLIC BLOOD PRESSURE: 120 MMHG | TEMPERATURE: 98.4 F | WEIGHT: 157 LBS

## 2021-01-14 DIAGNOSIS — R94.31 ABNORMAL ELECTROCARDIOGRAM [ECG] [EKG]: ICD-10-CM

## 2021-01-14 DIAGNOSIS — M54.5 LOW BACK PAIN: ICD-10-CM

## 2021-01-14 PROCEDURE — 71275 CT ANGIOGRAPHY CHEST: CPT | Mod: 26

## 2021-01-14 PROCEDURE — 99072 ADDL SUPL MATRL&STAF TM PHE: CPT

## 2021-01-14 PROCEDURE — 99214 OFFICE O/P EST MOD 30 MIN: CPT

## 2021-01-14 PROCEDURE — 71275 CT ANGIOGRAPHY CHEST: CPT

## 2021-01-14 NOTE — PLAN
[FreeTextEntry1] : Patient went for react CT this morning and waiting on results\par Pending a cardiac CT she is holding off on getting her epidural\par She has not taken Pepcid given previously and has been having chronic heartburn\par She will start taking her Pepcid 40 mg at bedtime tonight and then take 20 mg in the a.m. and 20 mg in the p.m. for the next 3 to 4 weeks\par Once she gets results in from her CT she will call me and update me on her results

## 2021-01-14 NOTE — HISTORY OF PRESENT ILLNESS
[FreeTextEntry8] : 49 y/o female present today with lower back pain, Lt shoulder pain and stomach issues. \par She went for Chest CT this morning and waiting on results\par She is waiting to get epidural done for her LBP but waiting on Cardiac results first\par She has been with reflux since her colonoscopy.  She was given Pepcid but never took the medication.

## 2021-02-18 ENCOUNTER — APPOINTMENT (OUTPATIENT)
Dept: CARDIOLOGY | Facility: CLINIC | Age: 51
End: 2021-02-18
Payer: MEDICAID

## 2021-02-18 PROCEDURE — 93015 CV STRESS TEST SUPVJ I&R: CPT

## 2021-02-18 PROCEDURE — 99072 ADDL SUPL MATRL&STAF TM PHE: CPT

## 2021-02-23 ENCOUNTER — OUTPATIENT (OUTPATIENT)
Dept: OUTPATIENT SERVICES | Facility: HOSPITAL | Age: 51
LOS: 1 days | End: 2021-02-23
Payer: MEDICAID

## 2021-02-23 DIAGNOSIS — Z20.828 CONTACT WITH AND (SUSPECTED) EXPOSURE TO OTHER VIRAL COMMUNICABLE DISEASES: ICD-10-CM

## 2021-02-23 LAB — SARS-COV-2 RNA SPEC QL NAA+PROBE: SIGNIFICANT CHANGE UP

## 2021-02-23 PROCEDURE — U0005: CPT

## 2021-02-23 PROCEDURE — U0003: CPT

## 2021-02-25 ENCOUNTER — OUTPATIENT (OUTPATIENT)
Dept: OUTPATIENT SERVICES | Facility: HOSPITAL | Age: 51
LOS: 1 days | Discharge: HOME | End: 2021-02-25
Payer: MEDICAID

## 2021-02-25 DIAGNOSIS — M54.16 RADICULOPATHY, LUMBAR REGION: ICD-10-CM

## 2021-02-25 PROCEDURE — 62323 NJX INTERLAMINAR LMBR/SAC: CPT

## 2021-04-23 ENCOUNTER — APPOINTMENT (OUTPATIENT)
Dept: INTERNAL MEDICINE | Facility: CLINIC | Age: 51
End: 2021-04-23
Payer: MEDICAID

## 2021-04-23 VITALS
TEMPERATURE: 98.1 F | HEART RATE: 88 BPM | RESPIRATION RATE: 14 BRPM | DIASTOLIC BLOOD PRESSURE: 80 MMHG | SYSTOLIC BLOOD PRESSURE: 120 MMHG | OXYGEN SATURATION: 98 %

## 2021-04-23 DIAGNOSIS — Z03.818 ENCOUNTER FOR OBSERVATION FOR SUSPECTED EXPOSURE TO OTHER BIOLOGICAL AGENTS RULED OUT: ICD-10-CM

## 2021-04-23 PROCEDURE — 99213 OFFICE O/P EST LOW 20 MIN: CPT

## 2021-04-23 PROCEDURE — 99072 ADDL SUPL MATRL&STAF TM PHE: CPT

## 2021-04-23 NOTE — PHYSICAL EXAM
[Normal] : no posterior cervical lymphadenopathy and no anterior cervical lymphadenopathy [de-identified] : TM intact bilaterally no erythema bilateral bulging present

## 2021-04-23 NOTE — PLAN
[FreeTextEntry1] : Patient will start over-the-counter allergy medication and Tylenol every 4-6 hours as needed\par COVID PCR test done in office today patient will be advised results once reviewed

## 2021-04-23 NOTE — HISTORY OF PRESENT ILLNESS
Quality 431: Preventive Care And Screening: Unhealthy Alcohol Use - Screening: Patient screened for unhealthy alcohol use using a single question and scores less than 2 times per year
[FreeTextEntry8] : Presents the office today for Covid testing as she has had a mild headache for the last few weeks and her sons and  both tested positive within the last 10 days Covid\par She has slightly scratchy throat today and ear pressure
Quality 134: Screening For Clinical Depression And Follow-Up Plan: The patient was screened for depression and the screen was negative and no follow up required
Quality 110: Preventive Care And Screening: Influenza Immunization: Influenza Immunization previously received during influenza season
Quality 111:Pneumonia Vaccination Status For Older Adults: Pneumococcal Vaccination Previously Received
Detail Level: Detailed
Quality 131: Pain Assessment And Follow-Up: Pain assessment using a standardized tool is documented as negative, no follow-up plan required
Quality 226: Preventive Care And Screening: Tobacco Use: Screening And Cessation Intervention: Patient screened for tobacco and never smoked

## 2021-04-26 ENCOUNTER — NON-APPOINTMENT (OUTPATIENT)
Age: 51
End: 2021-04-26

## 2021-04-26 LAB — SARS-COV-2 N GENE NPH QL NAA+PROBE: DETECTED

## 2021-05-11 ENCOUNTER — NON-APPOINTMENT (OUTPATIENT)
Age: 51
End: 2021-05-11

## 2021-06-03 ENCOUNTER — APPOINTMENT (OUTPATIENT)
Dept: INTERNAL MEDICINE | Facility: CLINIC | Age: 51
End: 2021-06-03
Payer: MEDICAID

## 2021-06-03 VITALS
HEART RATE: 60 BPM | SYSTOLIC BLOOD PRESSURE: 118 MMHG | OXYGEN SATURATION: 99 % | DIASTOLIC BLOOD PRESSURE: 80 MMHG | WEIGHT: 165 LBS | TEMPERATURE: 97.7 F | BODY MASS INDEX: 26.52 KG/M2 | HEIGHT: 66 IN

## 2021-06-03 PROCEDURE — 99214 OFFICE O/P EST MOD 30 MIN: CPT

## 2021-06-03 PROCEDURE — 99072 ADDL SUPL MATRL&STAF TM PHE: CPT

## 2021-06-03 NOTE — HISTORY OF PRESENT ILLNESS
[FreeTextEntry1] : 50 y/o female presents today in the office for hip pain. [de-identified] : Pt presents to the office today with right upper thigh pain for the last few days.  She noticed a few lumps in her right upper thigh region.  Pain in leg is burning in nature and skin is very sensitive to touch.

## 2021-06-03 NOTE — PLAN
[FreeTextEntry1] : Pt appears to have possible shingles and post herpetic neuralgia \par Pt will start Valtrex and prednisone given above and follow up in 2 weeks if not subsided\par Pt is aware this may get slightly worse before getting better.   \par If significantly worsens pt will follow up with me right away

## 2021-06-03 NOTE — PHYSICAL EXAM
[Normal] : no joint swelling and grossly normal strength and tone [de-identified] : papules in crop formations right upper thigh.  Skin sensitivity to touch L2 and L4 dermatome regions .  No increased warmth no masses  no

## 2021-06-04 ENCOUNTER — APPOINTMENT (OUTPATIENT)
Dept: GASTROENTEROLOGY | Facility: CLINIC | Age: 51
End: 2021-06-04
Payer: MEDICAID

## 2021-06-04 DIAGNOSIS — K63.5 POLYP OF COLON: ICD-10-CM

## 2021-06-04 PROCEDURE — 99214 OFFICE O/P EST MOD 30 MIN: CPT

## 2021-06-04 PROCEDURE — 99072 ADDL SUPL MATRL&STAF TM PHE: CPT

## 2021-06-04 NOTE — ASSESSMENT
[FreeTextEntry1] : IMPRESSION: \par #  History of constipation - advised Metamucil previously \par -   Colonoscopy on June 2020: 1.3 cm semipedunculated polyp in the ascending colon s/p hot snare s/p single hemoclip (tubular adenoma). 3 mm transverse colon polyp removed by cold snare (tubular adenoma). 6 mm polyp in the sigmoid colon removed by cold snare (tubular adenoma). Repeat colonoscopy in 6 months. \par -  Colonoscopy 12/2020:  Small hemorrhoids - repeat colonoscopy in 3 years \par \par #  Chronic GERD\par -  EGD on June 2020: Normal esophagus. Gastric erythema s/p bx. Normal duodenum s/p bx. Gastric bx showed H. pylori gastritis. Distal esophageal bx showed reflux without BE. Biopsies were negative for celiac disease. \par -  S/p triple therapy with negative breath test 8/2020\par \par #  CT angio chest (Jan 2021) Normal exam.  s/p stress test and echo recently  \par \par \par \par \par PLAN \par 1.  Increase fluids with increased fibrous foods discussed.  Colonoscopy discussed however no alarming symptoms and done recently. \par 2.  MiraLAX is not working once day \par 3.  Abdominal ultrasound \par 4.  Follow up in 3 months

## 2021-06-04 NOTE — HISTORY OF PRESENT ILLNESS
[de-identified] : 50 y/o mother of 5, who presents with chief complaint of constipation. \par \par She has been has bowel movement every day - sometimes 3 times a day - but small [place] - does not feel empty after a bowel movement - pushes and strains to have a bowel movement.  Then will have abdominal pain after a bowel movement.  \par \par Says she has been constipated since May of 2020 but lately has been getting worse a month.  \par \par Sometimes she takes Metamucil - takes dairy - has used Dulcolax a few times but had abdominal pain and stopped taking.  \par \par No melena and no hematochezia - says she does not look at her stools. \par \par Lately she has been nauseas if she is hungry.  \par \par \par Herminio diagnosed yesterday - has not started prednisone, Valtrex yet - she will today. \par \par Offers no other complaint. \par Initial visit on May 2020: \par She has tried acid suppressive therapy such as Nexium which have helped but it is temporary.\par \par She feels bloated and gassy. She has lost her appetite and lost about maybe 5 pounds since the past 5 months.\par \par Lately she also reports having constipation. She has a bowel movement every other day or every 3 days. Her stools can be hard. She denies any melena or hematochezia.\par \par She's never had an upper endoscopy or colonoscopy before.\par \par She denies any digestive cancers in her family.\par \par EGD on June 2020: Normal esophagus.  Gastric erythema s/p bx.  Normal duodenum s/p bx.  Gastric bx showed H. pylori gastritis.  Distal esophageal bx showed reflux without BE.  Biopsies were negative for celiac disease.  \par \par Colonoscopy on June 2020:  1.3 cm semipedunculated polyp in the ascending colon s/p hot snare s/p single hemoclip (tubular adenoma).  3 mm transverse colon polyp removed by cold snare (tubular adenoma).  6 mm polyp in the sigmoid colon removed by cold snare (tubular adenoma).  Repeat colonoscopy in 6 months. \par \par \par Discussion/Summary 6/2020\par Called and reviewed recent biopsies - H. pylori. Large colon polyp tubular adenoma. \par Will start triple therapy. Side effects of abx reviewed. Patient will come for H. pylori breath test one month to confirm eradication - discussed how to prep for it.  Advised family should be tested for H. pylori.  Repeat colonoscopy in 6 months. \par \par Discussion/Summary 7/2020\par Called patient - she says she has been having more acid reflux, feels like she has a yeast infection and was feeling constipated. advised taking Pepcid in addition to PPI she is on, advised taking Monistat for yeast infection and stool softener for constipation. Call back if symptoms persist. She has two more days of triple of therapy. \par \par Discussion/Summary 8/2020\par Called and reviewed recent negative H. pylori - Advised Metamucil once a day for constipation. \par Colonoscopy in 6 months from June 2020. \par \par Discussion/Summary 9/2020\par Called patient - she reported having more acid reflux - now feeling better - advised Pepcid over the counter for reflux. She has no other complaints. Will see me for her colonoscopy given Hx of high risk adenoma. \par \par Discussion/Summary Nov 2020\par She says every time she eats she feels something in her throat - feels a lump in throat - advised pantoprazole 40 mg twice a day for 4 wks - she does not want an upper endoscopy - she will see me in office\par \par \par Colonoscopy on Dec 2020:  Mild internal hemorrhoids otherwise normal exam to the cecum.  Repeat colonoscopy in 3 years.  \par \par CT angio chest on Jan 2021:  Normal exam. \par \par \par Discussion/Summary 5/2021\par Called patient - she has been feeling constipated - no fevers, no abdominal pain pain - advised MiraLAX once a day - follow up in then office.

## 2021-06-04 NOTE — PHYSICAL EXAM
[General Appearance - Alert] : alert [General Appearance - In No Acute Distress] : in no acute distress [Sclera] : the sclera and conjunctiva were normal [Extraocular Movements] : extraocular movements were intact [Outer Ear] : the ears and nose were normal in appearance [Hearing Threshold Finger Rub Not [place]] : hearing was normal [Neck Appearance] : the appearance of the neck was normal [Neck Cervical Mass (___cm)] : no neck mass was observed [Auscultation Breath Sounds / Voice Sounds] : lungs were clear to auscultation bilaterally [Heart Rate And Rhythm] : heart rate was normal and rhythm regular [Heart Sounds] : normal S1 and S2 [Heart Sounds Gallop] : no gallops [Murmurs] : no murmurs [Heart Sounds Pericardial Friction Rub] : no pericardial rub [Bowel Sounds] : normal bowel sounds [Abdomen Soft] : soft [Abdomen Tenderness] : non-tender [Abdomen Mass (___ Cm)] : no abdominal mass palpated [Cervical Lymph Nodes Enlarged Posterior Bilaterally] : posterior cervical [Cervical Lymph Nodes Enlarged Anterior Bilaterally] : anterior cervical [Supraclavicular Lymph Nodes Enlarged Bilaterally] : supraclavicular [Abnormal Walk] : normal gait [Nail Clubbing] : no clubbing  or cyanosis of the fingernails [Skin Color & Pigmentation] : normal skin color and pigmentation [] : no rash [Oriented To Time, Place, And Person] : oriented to person, place, and time [Impaired Insight] : insight and judgment were intact [Affect] : the affect was normal

## 2021-06-07 ENCOUNTER — NON-APPOINTMENT (OUTPATIENT)
Age: 51
End: 2021-06-07

## 2021-06-07 LAB
ALBUMIN SERPL ELPH-MCNC: 3.8 G/DL
ALP BLD-CCNC: 63 U/L
ALT SERPL-CCNC: 15 U/L
AMYLASE/CREAT SERPL: 92 U/L
ANION GAP SERPL CALC-SCNC: 11 MMOL/L
AST SERPL-CCNC: 18 U/L
BASOPHILS # BLD AUTO: 0.02 K/UL
BASOPHILS NFR BLD AUTO: 0.6 %
BILIRUB SERPL-MCNC: 0.5 MG/DL
BUN SERPL-MCNC: 7 MG/DL
CALCIUM SERPL-MCNC: 8.9 MG/DL
CHLORIDE SERPL-SCNC: 102 MMOL/L
CO2 SERPL-SCNC: 23 MMOL/L
CREAT SERPL-MCNC: 0.96 MG/DL
EOSINOPHIL # BLD AUTO: 0.1 K/UL
EOSINOPHIL NFR BLD AUTO: 3 %
GLUCOSE SERPL-MCNC: 97 MG/DL
HCT VFR BLD CALC: 40 %
HGB BLD-MCNC: 12.7 G/DL
IMM GRANULOCYTES NFR BLD AUTO: 0.3 %
LPL SERPL-CCNC: 18 U/L
LYMPHOCYTES # BLD AUTO: 1.72 K/UL
LYMPHOCYTES NFR BLD AUTO: 51.3 %
MAN DIFF?: NORMAL
MCHC RBC-ENTMCNC: 30.1 PG
MCHC RBC-ENTMCNC: 31.8 GM/DL
MCV RBC AUTO: 94.8 FL
MONOCYTES # BLD AUTO: 0.34 K/UL
MONOCYTES NFR BLD AUTO: 10.1 %
NEUTROPHILS # BLD AUTO: 1.16 K/UL
NEUTROPHILS NFR BLD AUTO: 34.7 %
PLATELET # BLD AUTO: 286 K/UL
POTASSIUM SERPL-SCNC: 4.1 MMOL/L
PROT SERPL-MCNC: 7.7 G/DL
RBC # BLD: 4.22 M/UL
RBC # FLD: 13.2 %
SODIUM SERPL-SCNC: 136 MMOL/L
TSH SERPL-ACNC: 1.49 UIU/ML
WBC # FLD AUTO: 3.35 K/UL

## 2021-06-14 ENCOUNTER — APPOINTMENT (OUTPATIENT)
Dept: INTERNAL MEDICINE | Facility: CLINIC | Age: 51
End: 2021-06-14
Payer: COMMERCIAL

## 2021-06-14 VITALS
WEIGHT: 160 LBS | RESPIRATION RATE: 14 BRPM | HEART RATE: 73 BPM | BODY MASS INDEX: 25.71 KG/M2 | TEMPERATURE: 98 F | OXYGEN SATURATION: 99 % | SYSTOLIC BLOOD PRESSURE: 120 MMHG | DIASTOLIC BLOOD PRESSURE: 80 MMHG | HEIGHT: 66 IN

## 2021-06-14 DIAGNOSIS — D72.819 DECREASED WHITE BLOOD CELL COUNT, UNSPECIFIED: ICD-10-CM

## 2021-06-14 PROCEDURE — 99072 ADDL SUPL MATRL&STAF TM PHE: CPT

## 2021-06-14 PROCEDURE — 99214 OFFICE O/P EST MOD 30 MIN: CPT | Mod: 25

## 2021-06-14 PROCEDURE — 36415 COLL VENOUS BLD VENIPUNCTURE: CPT

## 2021-06-14 NOTE — HISTORY OF PRESENT ILLNESS
[FreeTextEntry1] : 50 y/o female presents in the office for a f/u visit. [de-identified] : Pt presents to the office today for repeat low WBC\par She is seeing GI and had abd US done for RUQ pain that radiates to her right shoulder and upper right back.  Increased nausea\par Worse with fatty fried foods\par US still pending as pt just had done today

## 2021-06-14 NOTE — PLAN
[FreeTextEntry1] : Pt will be advised of ABD US results once reviewed \par Repeat CBC done in office today as prior WBC was low \par Reflux diet discussed with pt \par Avoid fatty fried food

## 2021-06-14 NOTE — PHYSICAL EXAM
[Normal] : no acute distress, well nourished, well developed and well-appearing [No Masses] : no abdominal mass palpated [de-identified] : RUQ pain pos [place] sign

## 2021-06-22 ENCOUNTER — NON-APPOINTMENT (OUTPATIENT)
Age: 51
End: 2021-06-22

## 2021-06-22 LAB
BASOPHILS # BLD AUTO: 0.02 K/UL
BASOPHILS NFR BLD AUTO: 0.4 %
EOSINOPHIL # BLD AUTO: 0.07 K/UL
EOSINOPHIL NFR BLD AUTO: 1.2 %
HCT VFR BLD CALC: 41.2 %
HGB BLD-MCNC: 13.2 G/DL
IMM GRANULOCYTES NFR BLD AUTO: 0.2 %
LYMPHOCYTES # BLD AUTO: 2.56 K/UL
LYMPHOCYTES NFR BLD AUTO: 45.5 %
MAN DIFF?: NORMAL
MCHC RBC-ENTMCNC: 30.5 PG
MCHC RBC-ENTMCNC: 32 GM/DL
MCV RBC AUTO: 95.2 FL
MONOCYTES # BLD AUTO: 0.51 K/UL
MONOCYTES NFR BLD AUTO: 9.1 %
NEUTROPHILS # BLD AUTO: 2.46 K/UL
NEUTROPHILS NFR BLD AUTO: 43.6 %
PLATELET # BLD AUTO: 274 K/UL
RBC # BLD: 4.33 M/UL
RBC # FLD: 13.5 %
WBC # FLD AUTO: 5.63 K/UL

## 2021-09-29 ENCOUNTER — APPOINTMENT (OUTPATIENT)
Dept: INTERNAL MEDICINE | Facility: CLINIC | Age: 51
End: 2021-09-29
Payer: COMMERCIAL

## 2021-09-29 VITALS
TEMPERATURE: 98.6 F | BODY MASS INDEX: 25.71 KG/M2 | HEIGHT: 66 IN | SYSTOLIC BLOOD PRESSURE: 110 MMHG | WEIGHT: 160 LBS | HEART RATE: 64 BPM | DIASTOLIC BLOOD PRESSURE: 70 MMHG | OXYGEN SATURATION: 97 %

## 2021-09-29 DIAGNOSIS — Z23 ENCOUNTER FOR IMMUNIZATION: ICD-10-CM

## 2021-09-29 DIAGNOSIS — K21.9 GASTRO-ESOPHAGEAL REFLUX DISEASE W/OUT ESOPHAGITIS: ICD-10-CM

## 2021-09-29 DIAGNOSIS — I71.00 DISSECTION OF UNSPECIFIED SITE OF AORTA: ICD-10-CM

## 2021-09-29 DIAGNOSIS — M54.16 RADICULOPATHY, LUMBAR REGION: ICD-10-CM

## 2021-09-29 PROCEDURE — G0008: CPT

## 2021-09-29 PROCEDURE — 99214 OFFICE O/P EST MOD 30 MIN: CPT | Mod: 25

## 2021-09-29 PROCEDURE — 90686 IIV4 VACC NO PRSV 0.5 ML IM: CPT

## 2021-09-29 NOTE — HISTORY OF PRESENT ILLNESS
[de-identified] : Pt presents to the office today for follow up with reflux and back to right leg pain.\par She has been with reflux still.   She has not been following a reflux diet skips multiple meals day.  She only tried Pepcid for 1 week and stopped as she did not have improvement\par She is now with increased LBP radiating down her right leg\par She had epidural done at the beginning of the year and felt much better.  she has not gone back for any other epidurals

## 2021-09-29 NOTE — PLAN
[FreeTextEntry1] : Pt will call and make appointment with her pain management dr for follow up and to get another epidural\par She will restart Pepcid.  Follow  a reflux diet.  have 5-6 small meals per day\par Keep food diary \par Influenza vaccination given to pt left delt.  Tolerated well with no acute reactions

## 2021-10-12 ENCOUNTER — OUTPATIENT (OUTPATIENT)
Dept: OUTPATIENT SERVICES | Facility: HOSPITAL | Age: 51
LOS: 1 days | End: 2021-10-12
Payer: COMMERCIAL

## 2021-10-12 DIAGNOSIS — Z20.828 CONTACT WITH AND (SUSPECTED) EXPOSURE TO OTHER VIRAL COMMUNICABLE DISEASES: ICD-10-CM

## 2021-10-12 LAB — SARS-COV-2 RNA SPEC QL NAA+PROBE: SIGNIFICANT CHANGE UP

## 2021-10-12 PROCEDURE — U0003: CPT

## 2021-10-12 PROCEDURE — U0005: CPT

## 2021-10-13 ENCOUNTER — TRANSCRIPTION ENCOUNTER (OUTPATIENT)
Age: 51
End: 2021-10-13

## 2021-10-14 ENCOUNTER — OUTPATIENT (OUTPATIENT)
Dept: OUTPATIENT SERVICES | Facility: HOSPITAL | Age: 51
LOS: 1 days | End: 2021-10-14
Payer: COMMERCIAL

## 2021-10-14 DIAGNOSIS — M54.16 RADICULOPATHY, LUMBAR REGION: ICD-10-CM

## 2021-10-14 PROCEDURE — 62323 NJX INTERLAMINAR LMBR/SAC: CPT

## 2021-10-27 ENCOUNTER — RX CHANGE (OUTPATIENT)
Age: 51
End: 2021-10-27

## 2021-12-16 ENCOUNTER — NON-APPOINTMENT (OUTPATIENT)
Age: 51
End: 2021-12-16

## 2021-12-16 ENCOUNTER — APPOINTMENT (OUTPATIENT)
Dept: CARDIOLOGY | Facility: CLINIC | Age: 51
End: 2021-12-16
Payer: COMMERCIAL

## 2021-12-16 VITALS
SYSTOLIC BLOOD PRESSURE: 123 MMHG | HEIGHT: 65 IN | HEART RATE: 64 BPM | BODY MASS INDEX: 27.49 KG/M2 | WEIGHT: 165 LBS | DIASTOLIC BLOOD PRESSURE: 80 MMHG | OXYGEN SATURATION: 99 %

## 2021-12-16 PROCEDURE — 93000 ELECTROCARDIOGRAM COMPLETE: CPT

## 2021-12-16 PROCEDURE — 99214 OFFICE O/P EST MOD 30 MIN: CPT

## 2022-02-05 ENCOUNTER — TRANSCRIPTION ENCOUNTER (OUTPATIENT)
Age: 52
End: 2022-02-05

## 2022-03-24 ENCOUNTER — APPOINTMENT (OUTPATIENT)
Dept: INTERNAL MEDICINE | Facility: CLINIC | Age: 52
End: 2022-03-24
Payer: COMMERCIAL

## 2022-03-24 VITALS
OXYGEN SATURATION: 99 % | WEIGHT: 168 LBS | TEMPERATURE: 97.9 F | SYSTOLIC BLOOD PRESSURE: 138 MMHG | DIASTOLIC BLOOD PRESSURE: 78 MMHG | BODY MASS INDEX: 27.99 KG/M2 | HEIGHT: 65 IN | HEART RATE: 72 BPM

## 2022-03-24 PROCEDURE — 36415 COLL VENOUS BLD VENIPUNCTURE: CPT

## 2022-03-24 PROCEDURE — 96372 THER/PROPH/DIAG INJ SC/IM: CPT

## 2022-03-24 PROCEDURE — 99214 OFFICE O/P EST MOD 30 MIN: CPT | Mod: 25

## 2022-03-24 RX ORDER — CYANOCOBALAMIN 1000 UG/ML
1000 INJECTION INTRAMUSCULAR; SUBCUTANEOUS
Qty: 0 | Refills: 0 | Status: COMPLETED | OUTPATIENT
Start: 2022-03-24

## 2022-03-24 RX ADMIN — CYANOCOBALAMIN 1 MCG/ML: 1000 INJECTION INTRAMUSCULAR; SUBCUTANEOUS at 00:00

## 2022-03-24 NOTE — PLAN
[FreeTextEntry1] : Patient will start physical therapy for left shoulder further help with her decreased range of motion and pain\par Other recommend patient have ultrasensitive treatment done as well in the area\par Blood work done to check B12 level today for B12 injection given to patient right deltoid no acute reactions patient tolerated injection well\par Patient will be advised blood work results once reviewed

## 2022-03-24 NOTE — PHYSICAL EXAM
[de-identified] : Left medial blood work detailed tenderness with palpation decreased range of motion of the left shoulder

## 2022-03-24 NOTE — HISTORY OF PRESENT ILLNESS
[FreeTextEntry8] : 52 y/o female presents to the office today with LT shoulder pain from COVID booster and low energy.\par Patient COVID booster was given on June 27 and left out however as per patient injection was given very low in the shoulder and has been having pain ever since\par Patient suffers with B12 deficiency and has been feeling very fatigued and tired over the last several weeks

## 2022-04-05 LAB — VIT B12 SERPL-MCNC: 1486 PG/ML

## 2022-05-02 ENCOUNTER — APPOINTMENT (OUTPATIENT)
Dept: INTERNAL MEDICINE | Facility: CLINIC | Age: 52
End: 2022-05-02
Payer: COMMERCIAL

## 2022-05-02 VITALS
OXYGEN SATURATION: 98 % | WEIGHT: 168 LBS | SYSTOLIC BLOOD PRESSURE: 110 MMHG | HEART RATE: 70 BPM | TEMPERATURE: 98.3 F | HEIGHT: 65 IN | BODY MASS INDEX: 27.99 KG/M2 | DIASTOLIC BLOOD PRESSURE: 70 MMHG

## 2022-05-02 PROCEDURE — 99213 OFFICE O/P EST LOW 20 MIN: CPT

## 2022-05-02 NOTE — PHYSICAL EXAM
[Ill-Appearing] : ill-appearing [Normal Oropharynx] : the oropharynx was normal [Normal TMs] : both tympanic membranes were normal [No Lymphadenopathy] : no lymphadenopathy [Supple] : supple [Normal] : normal rate, regular rhythm, normal S1 and S2 and no murmur heard [No Edema] : there was no peripheral edema [Soft] : abdomen soft [Non Tender] : non-tender

## 2022-05-02 NOTE — HISTORY OF PRESENT ILLNESS
[FreeTextEntry8] : Patient comes for an acute visit. \par \par She has been sick for 6 days. Started last Tuesday. She p/w fever (up to 102F), chest congestion, cough, body aches, sore throat, runny nose, and headache. No cp or SOB. Her  brought home a viral illness. Her daughter also got sick, diagnosed with bronchitis. She tried Tylenol cold & flu and Nyquil but not helping. She does not feel she is getting better. Home COVID test was negative on Thursday. \par

## 2022-05-02 NOTE — PLAN
[FreeTextEntry1] : Check RVP w/ COVID PCR.\par Send for CXR r/o PNA.\par Start Clarithromycin 500 mg BID x 7 days.\par Start Promethazine-DM.\par Advised rest / fluids.\par Work excuse letter provided.\par Call with results.

## 2022-05-02 NOTE — REVIEW OF SYSTEMS
[Fever] : fever [Fatigue] : fatigue [Nasal Discharge] : nasal discharge [Sore Throat] : sore throat [Chest Pain] : no chest pain [Shortness Of Breath] : no shortness of breath [Wheezing] : no wheezing [Cough] : cough

## 2022-05-04 ENCOUNTER — APPOINTMENT (OUTPATIENT)
Dept: INTERNAL MEDICINE | Facility: CLINIC | Age: 52
End: 2022-05-04
Payer: COMMERCIAL

## 2022-05-04 VITALS
SYSTOLIC BLOOD PRESSURE: 110 MMHG | WEIGHT: 168 LBS | OXYGEN SATURATION: 98 % | DIASTOLIC BLOOD PRESSURE: 70 MMHG | HEART RATE: 70 BPM | HEIGHT: 65 IN | BODY MASS INDEX: 27.99 KG/M2

## 2022-05-04 DIAGNOSIS — T78.3XXA ANGIONEUROTIC EDEMA, INITIAL ENCOUNTER: ICD-10-CM

## 2022-05-04 LAB
HMPV RNA SPEC QL NAA+PROBE: DETECTED
RAPID RVP RESULT: DETECTED
SARS-COV-2 RNA PNL RESP NAA+PROBE: NOT DETECTED

## 2022-05-04 PROCEDURE — 99213 OFFICE O/P EST LOW 20 MIN: CPT

## 2022-05-04 RX ORDER — HALCINONIDE TOPICAL 1 MG/ML
0.1 SOLUTION TOPICAL
Qty: 120 | Refills: 0 | Status: DISCONTINUED | COMMUNITY
Start: 2022-02-12

## 2022-05-04 RX ORDER — FLUCONAZOLE 200 MG/1
200 TABLET ORAL
Qty: 12 | Refills: 0 | Status: DISCONTINUED | COMMUNITY
Start: 2022-02-16

## 2022-05-04 RX ORDER — PROMETHAZINE HYDROCHLORIDE AND DEXTROMETHORPHAN HYDROBROMIDE ORAL SOLUTION 15; 6.25 MG/5ML; MG/5ML
6.25-15 SOLUTION ORAL
Qty: 200 | Refills: 0 | Status: DISCONTINUED | COMMUNITY
Start: 2022-05-02 | End: 2022-05-04

## 2022-05-04 RX ORDER — FAMOTIDINE 20 MG/1
20 TABLET, FILM COATED ORAL TWICE DAILY
Qty: 60 | Refills: 2 | Status: DISCONTINUED | COMMUNITY
Start: 2021-09-29 | End: 2022-05-04

## 2022-05-04 RX ORDER — KETOCONAZOLE 20.5 MG/ML
2 SHAMPOO, SUSPENSION TOPICAL
Qty: 120 | Refills: 0 | Status: DISCONTINUED | COMMUNITY
Start: 2021-12-01

## 2022-05-04 RX ORDER — CLARITHROMYCIN 500 MG/1
500 TABLET, FILM COATED ORAL
Qty: 14 | Refills: 0 | Status: DISCONTINUED | COMMUNITY
Start: 2022-05-02 | End: 2022-05-04

## 2022-05-04 RX ORDER — TIZANIDINE 2 MG/1
2 TABLET ORAL
Qty: 42 | Refills: 0 | Status: DISCONTINUED | COMMUNITY
Start: 2021-12-01

## 2022-05-04 NOTE — PHYSICAL EXAM
[Normal Oropharynx] : the oropharynx was normal [Normal TMs] : both tympanic membranes were normal [No Lymphadenopathy] : no lymphadenopathy [Supple] : supple [Normal] : normal rate, regular rhythm, normal S1 and S2 and no murmur heard [No Edema] : there was no peripheral edema [Soft] : abdomen soft [Non Tender] : non-tender [No Acute Distress] : no acute distress [Well Nourished] : well nourished [de-identified] : swollen upper/lower lips, tongue normal size

## 2022-05-04 NOTE — PLAN
[FreeTextEntry1] : Suspect drug-induced angioedema.\par Stop both Clarithromycin and Promethazine-DM as suspected offending agent.\par Give Prednisone 40 mg x 1 today, Benadryl 25 mg x 1 tonight.\par If lips still swollen tomorrow, will give another dose of Prednisone 40 mg x 1. \par Pt to alert me tomorrow of any concerns or questions.

## 2022-05-04 NOTE — HISTORY OF PRESENT ILLNESS
[FreeTextEntry8] : Patient comes for an acute visit. \par \par She was seen 2 days ago in office for URI. RVP positive for MPV. CXR was negative for pneumonia. She was given Clarithromycin and Promethazine-DM. Yesterday morning, she suddenly developed swollen and painful lips. Tongue was not swollen. No difficulty breathing. \par

## 2022-05-11 DIAGNOSIS — L70.9 ACNE, UNSPECIFIED: ICD-10-CM

## 2022-05-11 DIAGNOSIS — L65.9 NONSCARRING HAIR LOSS, UNSPECIFIED: ICD-10-CM

## 2022-06-13 ENCOUNTER — APPOINTMENT (OUTPATIENT)
Dept: INTERNAL MEDICINE | Facility: CLINIC | Age: 52
End: 2022-06-13
Payer: COMMERCIAL

## 2022-06-13 VITALS
OXYGEN SATURATION: 99 % | TEMPERATURE: 98.5 F | HEIGHT: 65 IN | SYSTOLIC BLOOD PRESSURE: 116 MMHG | HEART RATE: 80 BPM | WEIGHT: 168 LBS | DIASTOLIC BLOOD PRESSURE: 70 MMHG | BODY MASS INDEX: 27.99 KG/M2

## 2022-06-13 PROCEDURE — 99213 OFFICE O/P EST LOW 20 MIN: CPT

## 2022-06-13 RX ORDER — PREDNISONE 20 MG/1
20 TABLET ORAL
Qty: 10 | Refills: 0 | Status: COMPLETED | COMMUNITY
Start: 2022-05-04 | End: 2022-06-13

## 2022-06-13 NOTE — PLAN
[FreeTextEntry1] : Patient will start Valtrex and prednisone as given above and follow-up in 1 week if not feeling better

## 2022-06-13 NOTE — HISTORY OF PRESENT ILLNESS
[FreeTextEntry8] : 53 y/o female presents to the office today with right lower back pain that radiates into the groin area patient has crops of vesicles again and hip region where she previously had shingles

## 2022-08-01 ENCOUNTER — APPOINTMENT (OUTPATIENT)
Dept: INTERNAL MEDICINE | Facility: CLINIC | Age: 52
End: 2022-08-01

## 2022-08-01 VITALS
OXYGEN SATURATION: 99 % | HEART RATE: 72 BPM | DIASTOLIC BLOOD PRESSURE: 88 MMHG | HEIGHT: 66 IN | SYSTOLIC BLOOD PRESSURE: 138 MMHG | BODY MASS INDEX: 27.64 KG/M2 | WEIGHT: 172 LBS | TEMPERATURE: 97.9 F

## 2022-08-01 DIAGNOSIS — E53.8 DEFICIENCY OF OTHER SPECIFIED B GROUP VITAMINS: ICD-10-CM

## 2022-08-01 DIAGNOSIS — E04.1 NONTOXIC SINGLE THYROID NODULE: ICD-10-CM

## 2022-08-01 DIAGNOSIS — E55.9 VITAMIN D DEFICIENCY, UNSPECIFIED: ICD-10-CM

## 2022-08-01 PROCEDURE — 36415 COLL VENOUS BLD VENIPUNCTURE: CPT

## 2022-08-01 PROCEDURE — 99396 PREV VISIT EST AGE 40-64: CPT | Mod: 25

## 2022-08-01 NOTE — PLAN
[FreeTextEntry1] : Fasting blood work done in office today\par Patient sees cardiologist routinely\par Patient advised me possible rheumatoid arthritis in the family and would like to get tested\par Patient will continue with daily walking and healthy diet\par Follow-up with her pain management\par Follow-up with gastro\par Patient due for thyroid ultrasound Rx given to patient

## 2022-08-01 NOTE — HISTORY OF PRESENT ILLNESS
[FreeTextEntry1] : 51 y/o female presents to the office today for a physical exam with fasting labs.  [de-identified] : Today for physical exam and fasting blood work\par She is feeling well overall with no new complaints at present time\par She is still suffers with back pain and sees pain management and is overdue for epidural injections\par Patient is walking daily and has a healthy diet\par Patient still suffers with reflux and going to gastro for evaluation

## 2022-08-01 NOTE — HEALTH RISK ASSESSMENT
[Good] : ~his/her~  mood as  good [No falls in past year] : Patient reported no falls in the past year [0] : 2) Feeling down, depressed, or hopeless: Not at all (0) [PHQ-2 Negative - No further assessment needed] : PHQ-2 Negative - No further assessment needed [[id number]] : 0 [Change in mental status noted] : No change in mental status noted [Language] : denies difficulty with language [Behavior] : denies difficulty with behavior [None] : None [With Significant Other] : lives with significant other [] :  [Sexually Active] : sexually active [High Risk Behavior] : no high risk behavior [Fully functional (bathing, dressing, toileting, transferring, walking, feeding)] : Fully functional (bathing, dressing, toileting, transferring, walking, feeding) [Fully functional (using the telephone, shopping, preparing meals, housekeeping, doing laundry, using] : Fully functional and needs no help or supervision to perform IADLs (using the telephone, shopping, preparing meals, housekeeping, doing laundry, using transportation, managing medications and managing finances) [Reports changes in hearing] : Reports no changes in hearing [Reports changes in vision] : Reports no changes in vision [Reports normal functional visual acuity (ie: able to read med bottle)] : Reports normal functional visual acuity

## 2022-08-15 ENCOUNTER — OUTPATIENT (OUTPATIENT)
Dept: OUTPATIENT SERVICES | Facility: HOSPITAL | Age: 52
LOS: 1 days | End: 2022-08-15
Payer: COMMERCIAL

## 2022-08-15 DIAGNOSIS — M54.16 RADICULOPATHY, LUMBAR REGION: ICD-10-CM

## 2022-08-15 PROCEDURE — 62323 NJX INTERLAMINAR LMBR/SAC: CPT

## 2022-08-18 ENCOUNTER — TRANSCRIPTION ENCOUNTER (OUTPATIENT)
Age: 52
End: 2022-08-18

## 2022-08-18 LAB
25(OH)D3 SERPL-MCNC: 45.8 NG/ML
ALBUMIN SERPL ELPH-MCNC: 4.2 G/DL
ALP BLD-CCNC: 67 U/L
ALT SERPL-CCNC: 12 U/L
ANION GAP SERPL CALC-SCNC: 9 MMOL/L
APPEARANCE: CLEAR
AST SERPL-CCNC: 18 U/L
BACTERIA: NEGATIVE
BASOPHILS # BLD AUTO: 0.02 K/UL
BASOPHILS NFR BLD AUTO: 0.4 %
BILIRUB SERPL-MCNC: 0.3 MG/DL
BILIRUBIN URINE: NEGATIVE
BLOOD URINE: NEGATIVE
BUN SERPL-MCNC: 10 MG/DL
CALCIUM SERPL-MCNC: 9.3 MG/DL
CCP AB SER IA-ACNC: <8 UNITS
CHLORIDE SERPL-SCNC: 104 MMOL/L
CHOLEST SERPL-MCNC: 208 MG/DL
CO2 SERPL-SCNC: 23 MMOL/L
COLOR: NORMAL
CREAT SERPL-MCNC: 0.95 MG/DL
EGFR: 73 ML/MIN/1.73M2
EOSINOPHIL # BLD AUTO: 0.07 K/UL
EOSINOPHIL NFR BLD AUTO: 1.5 %
ESTIMATED AVERAGE GLUCOSE: 120 MG/DL
GLUCOSE QUALITATIVE U: NEGATIVE
GLUCOSE SERPL-MCNC: 86 MG/DL
HBA1C MFR BLD HPLC: 5.8 %
HCT VFR BLD CALC: 39.1 %
HDLC SERPL-MCNC: 78 MG/DL
HGB BLD-MCNC: 12.7 G/DL
HYALINE CASTS: 0 /LPF
IMM GRANULOCYTES NFR BLD AUTO: 0.2 %
KETONES URINE: NEGATIVE
LDLC SERPL CALC-MCNC: 120 MG/DL
LEUKOCYTE ESTERASE URINE: NEGATIVE
LYMPHOCYTES # BLD AUTO: 2.58 K/UL
LYMPHOCYTES NFR BLD AUTO: 54.4 %
MAN DIFF?: NORMAL
MCHC RBC-ENTMCNC: 30.5 PG
MCHC RBC-ENTMCNC: 32.5 GM/DL
MCV RBC AUTO: 93.8 FL
MICROSCOPIC-UA: NORMAL
MONOCYTES # BLD AUTO: 0.43 K/UL
MONOCYTES NFR BLD AUTO: 9.1 %
NEUTROPHILS # BLD AUTO: 1.63 K/UL
NEUTROPHILS NFR BLD AUTO: 34.4 %
NITRITE URINE: NEGATIVE
NONHDLC SERPL-MCNC: 130 MG/DL
PH URINE: 6.5
PLATELET # BLD AUTO: 269 K/UL
POTASSIUM SERPL-SCNC: 4.3 MMOL/L
PROT SERPL-MCNC: 8 G/DL
PROTEIN URINE: NEGATIVE
RBC # BLD: 4.17 M/UL
RBC # FLD: 14.2 %
RED BLOOD CELLS URINE: 3 /HPF
RF+CCP IGG SER-IMP: NEGATIVE
RHEUMATOID FACT SER QL: <10 IU/ML
SODIUM SERPL-SCNC: 136 MMOL/L
SPECIFIC GRAVITY URINE: 1.01
SQUAMOUS EPITHELIAL CELLS: 1 /HPF
T3FREE SERPL-MCNC: 2.88 PG/ML
T4 FREE SERPL-MCNC: 1.2 NG/DL
TRIGL SERPL-MCNC: 51 MG/DL
TSH SERPL-ACNC: 1.33 UIU/ML
UROBILINOGEN URINE: NORMAL
VIT B12 SERPL-MCNC: 537 PG/ML
WBC # FLD AUTO: 4.74 K/UL
WHITE BLOOD CELLS URINE: 0 /HPF

## 2022-08-22 ENCOUNTER — APPOINTMENT (OUTPATIENT)
Dept: GASTROENTEROLOGY | Facility: CLINIC | Age: 52
End: 2022-08-22

## 2022-08-22 VITALS
WEIGHT: 172 LBS | HEIGHT: 66 IN | BODY MASS INDEX: 27.64 KG/M2 | OXYGEN SATURATION: 98 % | DIASTOLIC BLOOD PRESSURE: 70 MMHG | HEART RATE: 66 BPM | SYSTOLIC BLOOD PRESSURE: 120 MMHG | RESPIRATION RATE: 16 BRPM

## 2022-08-22 DIAGNOSIS — R10.13 EPIGASTRIC PAIN: ICD-10-CM

## 2022-08-22 DIAGNOSIS — K59.09 OTHER CONSTIPATION: ICD-10-CM

## 2022-08-22 DIAGNOSIS — K21.9 GASTRO-ESOPHAGEAL REFLUX DISEASE W/OUT ESOPHAGITIS: ICD-10-CM

## 2022-08-22 DIAGNOSIS — R09.89 OTHER SPECIFIED SYMPTOMS AND SIGNS INVOLVING THE CIRCULATORY AND RESPIRATORY SYSTEMS: ICD-10-CM

## 2022-08-22 PROCEDURE — 99204 OFFICE O/P NEW MOD 45 MIN: CPT

## 2022-08-22 RX ORDER — PREDNISONE 20 MG/1
20 TABLET ORAL
Qty: 10 | Refills: 1 | Status: DISCONTINUED | COMMUNITY
Start: 2022-06-13 | End: 2022-08-22

## 2022-08-22 RX ORDER — VALACYCLOVIR 1 G/1
1 TABLET, FILM COATED ORAL 3 TIMES DAILY
Qty: 21 | Refills: 1 | Status: DISCONTINUED | COMMUNITY
Start: 2022-06-13 | End: 2022-08-22

## 2022-08-22 NOTE — HISTORY OF PRESENT ILLNESS
[_________] : Performed [unfilled] [de-identified] : NITHYA PEDRAZA is a 52 year old female with PMH of Raynaud's, alopecia, and tubal ligation, presenting today for evaluation of chronic GERD and constipation. Former patient of Dr. Stokes at the [place] office. She complains of daily acid reflux, throat clearing, globus sensation, and dysphagia. Last EGD was in 2020 and was positive for H. pylori which she was subsequently treated for. She does admit to consuming a lot of citrus which aggravates her symptoms. Denies nausea, vomiting, weight loss, loss of appetite. Not currently on any PPI or H2 therapy. Has tried Omeprazole, Pantoprazole, Ranitidine, and Carafate in the past with minimal relief. \par \par She reports chronic constipation for the last 2-3 years. Currently having a bowel movement every 3 days. The stool is hard and pebble like. She has tried Metamucil in the past with no relief. Dulcolax helps but causes abdominal pain. Has not tried MiraLAX before. She denies black or bloody stools. Colonoscopy in 2020 was significant for 3 tubular adenomas, one being 1.3 cm. She is due for her next colonoscopy in 2023.

## 2022-08-22 NOTE — PHYSICAL EXAM
[General Appearance - Alert] : alert [General Appearance - In No Acute Distress] : in no acute distress [Sclera] : the sclera and conjunctiva were normal [PERRL With Normal Accommodation] : pupils were equal in size, round, and reactive to light [Extraocular Movements] : extraocular movements were intact [Outer Ear] : the ears and nose were normal in appearance [Oropharynx] : the oropharynx was normal [Neck Appearance] : the appearance of the neck was normal [Neck Cervical Mass (___cm)] : no neck mass was observed [Jugular Venous Distention Increased] : there was no jugular-venous distention [Thyroid Diffuse Enlargement] : the thyroid was not enlarged [Thyroid Nodule] : there were no palpable thyroid nodules [Auscultation Breath Sounds / Voice Sounds] : lungs were clear to auscultation bilaterally [Heart Rate And Rhythm] : heart rate was normal and rhythm regular [Heart Sounds] : normal S1 and S2 [Heart Sounds Gallop] : no gallops [Murmurs] : no murmurs [Heart Sounds Pericardial Friction Rub] : no pericardial rub [Bowel Sounds] : normal bowel sounds [Abdomen Soft] : soft [Abdomen Mass (___ Cm)] : no abdominal mass palpated [Epigastric] : in the epigastric area [Abnormal Walk] : normal gait [Nail Clubbing] : no clubbing  or cyanosis of the fingernails [Musculoskeletal - Swelling] : no joint swelling seen [Motor Tone] : muscle strength and tone were normal [Skin Color & Pigmentation] : normal skin color and pigmentation [Skin Turgor] : normal skin turgor [] : no rash [Oriented To Time, Place, And Person] : oriented to person, place, and time [Impaired Insight] : insight and judgment were intact [Affect] : the affect was normal

## 2022-08-22 NOTE — ASSESSMENT
[FreeTextEntry1] : 52 year old female presenting with complaints of worsening GERD and constipation. Mild epigastric tenderness present on physical exam. She will be scheduled for an upper endoscopy for further evaluation. I have discussed the indications (including but not limited to ruling out Hopson's esophagus, AVM's, H. pylori, and malignancies), benefits, risks (including but not limited to reaction to the anesthesia, infection, bleeding, and perforation), and alternatives to an endoscopy. The patient understands all options and has agreed to endoscopy and is medically optimized for the planned procedure. \par \par GERD diet provided\par Start Pantoprazole 40 mg QD\par Use Pepcid OTC PRN for breakthrough symptoms\par Today's instructions for acid reflux include avoiding provocative foods such as citrus, peppermint, fried foods, chocolate, alcohol and caffeine. Eat smaller more frequent meals and no eating within 2 hours of bedtime and elevate the head of the bed prior to sleep. \par \par Start MiraLAX 17 G daily for constipation and taper dose to effect\par Colonoscopy due in 2023

## 2022-09-13 ENCOUNTER — NON-APPOINTMENT (OUTPATIENT)
Age: 52
End: 2022-09-13

## 2022-10-30 ENCOUNTER — TRANSCRIPTION ENCOUNTER (OUTPATIENT)
Age: 52
End: 2022-10-30

## 2022-10-31 ENCOUNTER — RESULT REVIEW (OUTPATIENT)
Age: 52
End: 2022-10-31

## 2022-10-31 ENCOUNTER — APPOINTMENT (OUTPATIENT)
Dept: GASTROENTEROLOGY | Facility: GI CENTER | Age: 52
End: 2022-10-31

## 2022-10-31 ENCOUNTER — OUTPATIENT (OUTPATIENT)
Dept: OUTPATIENT SERVICES | Facility: HOSPITAL | Age: 52
LOS: 1 days | Discharge: HOME | End: 2022-10-31
Payer: COMMERCIAL

## 2022-10-31 DIAGNOSIS — K21.9 GASTRO-ESOPHAGEAL REFLUX DISEASE WITHOUT ESOPHAGITIS: ICD-10-CM

## 2022-10-31 DIAGNOSIS — R13.12 DYSPHAGIA, OROPHARYNGEAL PHASE: ICD-10-CM

## 2022-10-31 DIAGNOSIS — K21.9 GASTRO-ESOPHAGEAL REFLUX DISEASE W/OUT ESOPHAGITIS: ICD-10-CM

## 2022-10-31 PROCEDURE — 88342 IMHCHEM/IMCYTCHM 1ST ANTB: CPT

## 2022-10-31 PROCEDURE — 43239 EGD BIOPSY SINGLE/MULTIPLE: CPT

## 2022-10-31 PROCEDURE — 88305 TISSUE EXAM BY PATHOLOGIST: CPT | Mod: 26

## 2022-10-31 PROCEDURE — 88305 TISSUE EXAM BY PATHOLOGIST: CPT

## 2022-10-31 PROCEDURE — 88342 IMHCHEM/IMCYTCHM 1ST ANTB: CPT | Mod: 26

## 2022-10-31 NOTE — REVIEW OF SYSTEMS
[Heartburn] : heartburn [Negative] : Heme/Lymph [Abdominal Pain] : no abdominal pain [Vomiting] : no vomiting [Constipation] : no constipation [Diarrhea] : no diarrhea [Melena (black stool)] : no melena [Bleeding] : no bleeding [Fecal Incontinence (soiling)] : no fecal incontinence [Bloating (gassiness)] : no bloating [FreeTextEntry7] : Dysphagia

## 2022-10-31 NOTE — PHYSICAL EXAM
[Alert] : alert [Normal Voice/Communication] : normal voice/communication [Healthy Appearing] : healthy appearing [No Acute Distress] : no acute distress [Well Developed] : well developed [Well Nourished] : well nourished [Sclera] : the sclera and conjunctiva were normal [Hearing Threshold Finger Rub Not Prowers] : hearing was normal [Normal Lips/Gums] : the lips and gums were normal [Normal Appearance] : the appearance of the neck was normal [Oropharynx] : the oropharynx was normal [No Neck Mass] : no neck mass was observed [No Respiratory Distress] : no respiratory distress [No Acc Muscle Use] : no accessory muscle use [Respiration, Rhythm And Depth] : normal respiratory rhythm and effort [Auscultation Breath Sounds / Voice Sounds] : lungs were clear to auscultation bilaterally [Heart Rate And Rhythm] : heart rate was normal and rhythm regular [Normal S1, S2] : normal S1 and S2 [Murmurs] : no murmurs [Bowel Sounds] : normal bowel sounds [No Masses] : no abdominal mass palpated [Abdomen Tenderness] : non-tender [Abdomen Soft] : soft [] : no hepatosplenomegaly [Oriented To Time, Place, And Person] : oriented to person, place, and time

## 2022-11-04 LAB — SURGICAL PATHOLOGY STUDY: SIGNIFICANT CHANGE UP

## 2022-11-21 ENCOUNTER — APPOINTMENT (OUTPATIENT)
Dept: INTERNAL MEDICINE | Facility: CLINIC | Age: 52
End: 2022-11-21

## 2022-11-22 ENCOUNTER — APPOINTMENT (OUTPATIENT)
Dept: INTERNAL MEDICINE | Facility: CLINIC | Age: 52
End: 2022-11-22

## 2022-11-22 ENCOUNTER — NON-APPOINTMENT (OUTPATIENT)
Age: 52
End: 2022-11-22

## 2022-11-22 VITALS
BODY MASS INDEX: 27.16 KG/M2 | WEIGHT: 169 LBS | HEIGHT: 66 IN | OXYGEN SATURATION: 96 % | TEMPERATURE: 98 F | DIASTOLIC BLOOD PRESSURE: 78 MMHG | HEART RATE: 82 BPM | SYSTOLIC BLOOD PRESSURE: 126 MMHG

## 2022-11-22 DIAGNOSIS — R19.4 CHANGE IN BOWEL HABIT: ICD-10-CM

## 2022-11-22 DIAGNOSIS — R10.9 UNSPECIFIED ABDOMINAL PAIN: ICD-10-CM

## 2022-11-22 PROCEDURE — 99213 OFFICE O/P EST LOW 20 MIN: CPT

## 2022-11-22 NOTE — PHYSICAL EXAM
[Soft] : abdomen soft [Non-distended] : non-distended [No Masses] : no abdominal mass palpated [de-identified] : Mild LLQ and RLU supra pubic tenderness Increase bowel sounds

## 2022-11-22 NOTE — HISTORY OF PRESENT ILLNESS
[de-identified] : 52 year old female patient here at the office today for stomachaches for 1-2 weeks.\par Lower abd area cracking and intermittent pain with watery stool\par Daughter was sick with this 1 week ago and resolved \par No blood per rectum no mucous \par

## 2022-11-22 NOTE — REVIEW OF SYSTEMS
[Diarrhea] : diarrhea [Abdominal Pain] : no abdominal pain [Nausea] : no nausea [Constipation] : no constipation [Vomiting] : no vomiting [Heartburn] : no heartburn

## 2022-11-28 ENCOUNTER — TRANSCRIPTION ENCOUNTER (OUTPATIENT)
Age: 52
End: 2022-11-28

## 2022-11-28 ENCOUNTER — APPOINTMENT (OUTPATIENT)
Dept: INTERNAL MEDICINE | Facility: CLINIC | Age: 52
End: 2022-11-28

## 2022-11-28 VITALS
SYSTOLIC BLOOD PRESSURE: 108 MMHG | HEIGHT: 66 IN | HEART RATE: 51 BPM | DIASTOLIC BLOOD PRESSURE: 78 MMHG | WEIGHT: 169 LBS | BODY MASS INDEX: 27.16 KG/M2 | OXYGEN SATURATION: 95 % | TEMPERATURE: 99.9 F

## 2022-11-28 DIAGNOSIS — R09.89 OTHER SPECIFIED SYMPTOMS AND SIGNS INVOLVING THE CIRCULATORY AND RESPIRATORY SYSTEMS: ICD-10-CM

## 2022-11-28 DIAGNOSIS — R52 PAIN, UNSPECIFIED: ICD-10-CM

## 2022-11-28 DIAGNOSIS — U07.1 COVID-19: ICD-10-CM

## 2022-11-28 LAB
APPEARANCE: CLEAR
BACTERIA: NEGATIVE
BILIRUBIN URINE: NEGATIVE
BLOOD URINE: NEGATIVE
COLOR: YELLOW
FLUAV SPEC QL CULT: NEGATIVE
FLUBV AG SPEC QL IA: NEGATIVE
GLUCOSE QUALITATIVE U: NEGATIVE
HYALINE CASTS: 0 /LPF
KETONES URINE: NEGATIVE
LEUKOCYTE ESTERASE URINE: NEGATIVE
MICROSCOPIC-UA: NORMAL
NITRITE URINE: NEGATIVE
PH URINE: 8.5
PROTEIN URINE: ABNORMAL
RED BLOOD CELLS URINE: 5 /HPF
SARS-COV-2 AG RESP QL IA.RAPID: POSITIVE
SPECIFIC GRAVITY URINE: 1.02
SQUAMOUS EPITHELIAL CELLS: 3 /HPF
UROBILINOGEN URINE: NORMAL
WHITE BLOOD CELLS URINE: 2 /HPF

## 2022-11-28 PROCEDURE — 99214 OFFICE O/P EST MOD 30 MIN: CPT | Mod: 25

## 2022-11-28 PROCEDURE — 87804 INFLUENZA ASSAY W/OPTIC: CPT | Mod: QW

## 2022-11-28 PROCEDURE — 87811 SARS-COV-2 COVID19 W/OPTIC: CPT

## 2022-11-28 NOTE — PLAN
[FreeTextEntry1] : Rapid influenza negative\par COVID positive\par \par Pt will quarantine as per CDC and health department guidelines \par Pt will rest increase her fluids\par DayQuil as needed for symptom control.\par If any new or worsening symptoms pt will call or return to office \par \par Viral PCR still done as pt was exposed to Flu and Rapid was mild positive

## 2022-11-28 NOTE — REVIEW OF SYSTEMS
[Fever] : fever [Chills] : chills [Fatigue] : fatigue [Earache] : no earache [Hoarseness] : no hoarseness [Nasal Discharge] : nasal discharge [Sore Throat] : no sore throat [Shortness Of Breath] : no shortness of breath [Wheezing] : no wheezing [Cough] : cough [Dyspnea on Exertion] : no dyspnea on exertion [Muscle Pain] : muscle pain [Negative] : Cardiovascular

## 2022-11-28 NOTE — HISTORY OF PRESENT ILLNESS
[Cough] : cough [Cold Symptoms] : cold symptoms [FreeTextEntry8] : 52 year old female patient here at the office today for coughing,sneezing,runny nose, bodyaches mild HA and chest tightness since saturday . Tested negative for covid on saturday with a home test.\par Pt taking Tylenol and Theraflu

## 2022-12-01 LAB
BACTERIA STL CULT: NORMAL
BACTERIA UR CULT: NORMAL
DEPRECATED O AND P PREP STL: NORMAL
RAPID RVP RESULT: DETECTED
SARS-COV-2 RNA PNL RESP NAA+PROBE: DETECTED

## 2023-05-14 NOTE — HISTORY OF PRESENT ILLNESS
[FreeTextEntry1] : 53 y/o mother of 5, who presents with chief complaint of constipation. \par \par \par \par Initial visit on May 2020: \par She has tried acid suppressive therapy such as Nexium which have helped but it is temporary.\par She feels bloated and gassy. She has lost her appetite and lost about maybe 5 pounds since the past 5 months.\par Lately she also reports having constipation. She has a bowel movement every other day or every 3 days. Her stools can be hard. She denies any melena or hematochezia.\par She's never had an upper endoscopy or colonoscopy before.\par She denies any digestive cancers in her family.\par EGD on June 2020: Normal esophagus. Gastric erythema s/p bx. Normal duodenum s/p bx. Gastric bx showed H. pylori gastritis. Distal esophageal bx showed reflux without BE. Biopsies were negative for celiac disease. \par \par Colonoscopy on June 2020: 1.3 cm semipedunculated polyp in the ascending colon s/p hot snare s/p single hemoclip (tubular adenoma). 3 mm transverse colon polyp removed by cold snare (tubular adenoma). 6 mm polyp in the sigmoid colon removed by cold snare (tubular adenoma). Repeat colonoscopy in 6 months. \par \par \par Discussion/Summary 6/2020\par Called and reviewed recent biopsies - H. pylori. Large colon polyp tubular adenoma. \par Will start triple therapy. Side effects of abx reviewed. Patient will come for H. pylori breath test one month to confirm eradication - discussed how to prep for it. Advised family should be tested for H. pylori. Repeat colonoscopy in 6 months. \par \par Discussion/Summary 7/2020\par Called patient - she says she has been having more acid reflux, feels like she has a yeast infection and was feeling constipated. advised taking Pepcid in addition to PPI she is on, advised taking Monistat for yeast infection and stool softener for constipation. Call back if symptoms persist. She has two more days of triple of therapy. \par \par Discussion/Summary 8/2020\par Called and reviewed recent negative H. pylori - Advised Metamucil once a day for constipation. \par Colonoscopy in 6 months from June 2020. \par \par Discussion/Summary 9/2020\par Called patient - she reported having more acid reflux - now feeling better - advised Pepcid over the counter for reflux. She has no other complaints. Will see me for her colonoscopy given Hx of high risk adenoma. \par \par Discussion/Summary Nov 2020\par She says every time she eats she feels something in her throat - feels a lump in throat - advised pantoprazole 40 mg twice a day for 4 wks - she does not want an upper endoscopy - she will see me in office\par \par Colonoscopy on Dec 2020: Mild internal hemorrhoids otherwise normal exam to the cecum. Repeat colonoscopy in 3 years. CT angio chest on Jan 2021: Normal exam. \par \par Discussion/Summary 5/2021\par Called patient - she has been feeling constipated - no fevers, no abdominal pain pain - advised MiraLAX once a day - follow up in then office. \par  \par \par Office visit 6/2021: \par She has been has bowel movement every day - sometimes 3 times a day - but small [place] - does not feel empty after a bowel movement - pushes and strains to have a bowel movement. Then will have abdominal pain after a bowel movement. Says she has been constipated since May of 2020 but lately has been getting worse a month. \par Sometimes she takes Metamucil - takes dairy - has used Dulcolax a few times but had abdominal pain and stopped taking. No melena and no hematochezia - says she does not look at her stools. Lately she has been nauseas if she is hungry. Shingle diagnosed yesterday - has not started prednisone, Valtrex yet - she will today. Offers no other complaint.

## 2023-05-14 NOTE — ASSESSMENT
[FreeTextEntry1] : IMPRESSION: \par # History of constipation - tried Metamucil, MiraLAX in past \par -  Abdominal ultrasound in 6/2021:  Nml\par \par #  High risk adenoma  \par -  Colonoscopy by Dr. Stokes on 12/2020:  No polyps.  Good prep. Rpt in 3 years. \par -  Colonoscopy by Dr. Stokes on 5/2020: 1.3 cm semipedunculated polyp in the ascending colon s/p hot snare s/p single hemoclip (tubular adenoma). 3 mm transverse colon polyp removed by cold snare (tubular adenoma). 6 mm polyp in the sigmoid colon removed by cold snare (tubular adenoma).\par \par # Chronic GERD, H. pylori s/p successful treatment in 2020 - Pantoprazole twice a day in past \par -  EGD by Dr. Dr. Umana on 10/2022:  Nml exam s/p bx. Proximal, mid, distal esophageal bx with reactive changed consistent with reflux, neg for EoE.  Gastric bx neg for HP.\par - EGD by Dr. Stokes on 2020: Normal esophagus. Gastric erythema s/p bx. Normal duodenum s/p bx. Gastric bx showed H. pylori gastritis. Distal esophageal bx showed reflux without BE. Biopsies were negative for celiac disease. - S/p triple therapy with negative breath test 8/2020\par \par # CT angio chest (Jan 2021) Normal exam. s/p stress test and echo recently \par \par \par \par \par PLAN \par 1. Increase fluids with increased fibrous foods discussed. Colonoscopy discussed however no alarming symptoms and done recently. \par 2. MiraLAX is not working once day \par 3. Abdominal ultrasound \par 4. Follow up in 3 months. \par \par

## 2023-05-16 ENCOUNTER — APPOINTMENT (OUTPATIENT)
Dept: GASTROENTEROLOGY | Facility: CLINIC | Age: 53
End: 2023-05-16

## 2023-08-07 ENCOUNTER — APPOINTMENT (OUTPATIENT)
Dept: INTERNAL MEDICINE | Facility: CLINIC | Age: 53
End: 2023-08-07
Payer: COMMERCIAL

## 2023-08-07 ENCOUNTER — NON-APPOINTMENT (OUTPATIENT)
Age: 53
End: 2023-08-07

## 2023-08-07 VITALS
DIASTOLIC BLOOD PRESSURE: 80 MMHG | BODY MASS INDEX: 26.2 KG/M2 | OXYGEN SATURATION: 98 % | TEMPERATURE: 98.4 F | HEIGHT: 66 IN | SYSTOLIC BLOOD PRESSURE: 120 MMHG | WEIGHT: 163 LBS | HEART RATE: 59 BPM

## 2023-08-07 DIAGNOSIS — Z12.31 ENCOUNTER FOR SCREENING MAMMOGRAM FOR MALIGNANT NEOPLASM OF BREAST: ICD-10-CM

## 2023-08-07 DIAGNOSIS — Z00.00 ENCOUNTER FOR GENERAL ADULT MEDICAL EXAMINATION W/OUT ABNORMAL FINDINGS: ICD-10-CM

## 2023-08-07 DIAGNOSIS — Z13.6 ENCOUNTER FOR SCREENING FOR CARDIOVASCULAR DISORDERS: ICD-10-CM

## 2023-08-07 PROCEDURE — 93000 ELECTROCARDIOGRAM COMPLETE: CPT

## 2023-08-07 PROCEDURE — 99396 PREV VISIT EST AGE 40-64: CPT | Mod: 25

## 2023-08-07 PROCEDURE — 36415 COLL VENOUS BLD VENIPUNCTURE: CPT

## 2023-08-07 NOTE — PLAN
[FreeTextEntry1] : Pt will make appointment for mammo FBW done in office today  Follow up with ortho for right hip follow up Right hand Ortho for worker comp  EKG SR @ 60 BPM No changes.  Sees Cardio Dr Barnard

## 2023-08-07 NOTE — HEALTH RISK ASSESSMENT
[Good] : ~his/her~  mood as  good [No falls in past year] : Patient reported no falls in the past year [0] : 2) Feeling down, depressed, or hopeless: Not at all (0) [PHQ-2 Negative - No further assessment needed] : PHQ-2 Negative - No further assessment needed [None] : None [With Significant Other] : lives with significant other [Fully functional (bathing, dressing, toileting, transferring, walking, feeding)] : Fully functional (bathing, dressing, toileting, transferring, walking, feeding) [Fully functional (using the telephone, shopping, preparing meals, housekeeping, doing laundry, using] : Fully functional and needs no help or supervision to perform IADLs (using the telephone, shopping, preparing meals, housekeeping, doing laundry, using transportation, managing medications and managing finances) [Reports normal functional visual acuity (ie: able to read med bottle)] : Reports normal functional visual acuity [Never] : Never [FSO3Glive] : 0 [Change in mental status noted] : No change in mental status noted [Language] : denies difficulty with language [Behavior] : denies difficulty with behavior [Reports changes in hearing] : Reports no changes in hearing [Reports changes in vision] : Reports no changes in vision

## 2023-08-28 PROBLEM — Z13.6 ENCOUNTER FOR SCREENING FOR CARDIOVASCULAR DISORDERS: Status: ACTIVE | Noted: 2023-08-28

## 2023-09-11 ENCOUNTER — APPOINTMENT (OUTPATIENT)
Dept: ORTHOPEDIC SURGERY | Facility: CLINIC | Age: 53
End: 2023-09-11
Payer: OTHER MISCELLANEOUS

## 2023-09-11 VITALS — BODY MASS INDEX: 26.2 KG/M2 | HEIGHT: 66 IN | WEIGHT: 163 LBS

## 2023-09-11 DIAGNOSIS — S60.222A CONTUSION OF LEFT HAND, INITIAL ENCOUNTER: ICD-10-CM

## 2023-09-11 DIAGNOSIS — S60.221A CONTUSION OF RIGHT HAND, INITIAL ENCOUNTER: ICD-10-CM

## 2023-09-11 PROCEDURE — 73130 X-RAY EXAM OF HAND: CPT | Mod: RT

## 2023-09-11 PROCEDURE — 99203 OFFICE O/P NEW LOW 30 MIN: CPT

## 2023-09-28 ENCOUNTER — APPOINTMENT (OUTPATIENT)
Dept: MRI IMAGING | Facility: CLINIC | Age: 53
End: 2023-09-28

## 2024-03-22 PROBLEM — R73.09 ELEVATED HEMOGLOBIN A1C: Status: ACTIVE | Noted: 2018-11-09

## 2024-03-25 ENCOUNTER — APPOINTMENT (OUTPATIENT)
Dept: INTERNAL MEDICINE | Facility: CLINIC | Age: 54
End: 2024-03-25
Payer: COMMERCIAL

## 2024-03-25 VITALS
HEIGHT: 66 IN | DIASTOLIC BLOOD PRESSURE: 84 MMHG | HEART RATE: 79 BPM | BODY MASS INDEX: 27.64 KG/M2 | TEMPERATURE: 98 F | WEIGHT: 172 LBS | OXYGEN SATURATION: 98 % | SYSTOLIC BLOOD PRESSURE: 136 MMHG

## 2024-03-25 DIAGNOSIS — M25.50 PAIN IN UNSPECIFIED JOINT: ICD-10-CM

## 2024-03-25 DIAGNOSIS — M79.10 MYALGIA, UNSPECIFIED SITE: ICD-10-CM

## 2024-03-25 PROCEDURE — 99214 OFFICE O/P EST MOD 30 MIN: CPT

## 2024-03-25 RX ORDER — METHYLPREDNISOLONE 4 MG/1
4 TABLET ORAL
Qty: 1 | Refills: 0 | Status: COMPLETED | COMMUNITY
Start: 2023-09-11 | End: 2024-03-25

## 2024-03-25 NOTE — HISTORY OF PRESENT ILLNESS
[FreeTextEntry8] : Patient presents to the office today with right hip pain for the last few days.  Patient states she has been having multiple joint pains upper and lower extremities including muscle pain No traumas

## 2024-03-28 ENCOUNTER — APPOINTMENT (OUTPATIENT)
Dept: GASTROENTEROLOGY | Facility: CLINIC | Age: 54
End: 2024-03-28
Payer: COMMERCIAL

## 2024-03-28 VITALS
BODY MASS INDEX: 27.48 KG/M2 | HEART RATE: 73 BPM | RESPIRATION RATE: 18 BRPM | HEIGHT: 66 IN | WEIGHT: 171 LBS | DIASTOLIC BLOOD PRESSURE: 96 MMHG | SYSTOLIC BLOOD PRESSURE: 145 MMHG | OXYGEN SATURATION: 96 %

## 2024-03-28 DIAGNOSIS — Z12.12 ENCOUNTER FOR SCREENING FOR MALIGNANT NEOPLASM OF COLON: ICD-10-CM

## 2024-03-28 DIAGNOSIS — Z12.11 ENCOUNTER FOR SCREENING FOR MALIGNANT NEOPLASM OF COLON: ICD-10-CM

## 2024-03-28 DIAGNOSIS — R73.09 OTHER ABNORMAL GLUCOSE: ICD-10-CM

## 2024-03-28 PROCEDURE — 99214 OFFICE O/P EST MOD 30 MIN: CPT

## 2024-03-28 RX ORDER — PREDNISONE 50 MG/1
TABLET ORAL
Refills: 0 | Status: ACTIVE | COMMUNITY

## 2024-03-28 RX ORDER — SODIUM SULFATE, MAGNESIUM SULFATE, AND POTASSIUM CHLORIDE 17.75; 2.7; 2.25 G/1; G/1; G/1
1479-225-188 TABLET ORAL
Qty: 1 | Refills: 0 | Status: ACTIVE | COMMUNITY
Start: 2024-03-28 | End: 1900-01-01

## 2024-03-28 NOTE — ASSESSMENT
[FreeTextEntry1] : IMPRESSION: # History of three tubular adenomas one being -  - Colonoscopy 12/2020:  No colon polyps, rpt in 3 yrs.  - Colonoscopy on 6/2020: 1.3 cm semi-pedunculated polyp in the ascending colon s/p hot snare s/p single hemoclip (tubular adenoma). 3 mm transverse colon polyp removed by cold snare (tubular adenoma). 6 mm polyp in the sigmoid colon removed by cold snare (tubular adenoma).   #  Unproven GERD -  sometimes burning in chest or throat - says not as bad before.   -  EGD by Dr. Umana on 10/2022:  Nml esophagus s/p bx (neg for EoE, BE).  Nml gastric mucosa s/p bx (neg for HP and IM).  Nml duodenum.   - EGD by Dr. Stokes on June 2020: Normal esophagus. Gastric erythema s/p bx. Normal duodenum s/p bx. Gastric bx showed H. pylori gastritis. Distal esophageal bx showed reflux without BE. Biopsies were negative for celiac disease. - S/p triple therapy with negative breath test 8/2020     PLAN I have advised the patient to arrange for a colonoscopy to rule out colon polyps, colorectal cancer etc. under monitored anesthesia care.  Risks such as perforation requiring surgery, colostomy, bleeding requiring blood transfusion, infection/sepsis, diverticulitis, colitis, missed colon cancer (2% to 6%), internal organ injury such as splenic hemorrhage (1/6000, risk thin, female gender) etc, adverse reaction to medication etc. and risks of anesthesia including cardiopulmonary compromise requiring ICU care were discussed with patient.  Alternatives were discussed (CT colonography, stool test etc).  Patient verbalized understanding and agrees to proceed with a colonoscopy under anesthesia.  Risk factors for inadequate prep: -  Previous inadequate prep -  Previous colon resection -  Constipation drugs, chronic constipation -  DM, Obesity, stroke, spinal cord injury, Parkinson's  Give Dulcolax two to three nights (2 pills afternoon)  If ten tubular adenomas can consider genetic testing of now since large in 2020 - contact information given.

## 2024-03-28 NOTE — HISTORY OF PRESENT ILLNESS
[FreeTextEntry1] : 52 y/o mother of 5, who presents for a colonoscopy.   sometimes burning in chest or throat - says not as bad before.   Constipated at times - better than before.   Patient denies having abdominal pain, diarrhea, loss of appetite, weight loss, melena and hematochezia. All other review of systems are negative.  Denies cardiac symptoms.  Initial visit on May 2020: She has tried acid suppressive therapy such as Nexium which have helped but it is temporary.   She feels bloated and gassy. She has lost her appetite and lost about maybe 5 pounds since the past 5 months. Lately she also reports having constipation. She has a bowel movement every other day or every 3 days. Her stools can be hard. She denies any melena or hematochezia.  She's never had an upper endoscopy or colonoscopy before. She denies any digestive cancers in her family. EGD on June 2020: Normal esophagus. Gastric erythema s/p bx. Normal duodenum s/p bx. Gastric bx showed H. pylori gastritis. Distal esophageal bx showed reflux without BE. Biopsies were negative for celiac disease. Colonoscopy on June 2020: 1.3 cm semipedunculated polyp in the ascending colon s/p hot snare s/p single hemoclip (tubular adenoma). 3 mm transverse colon polyp removed by cold snare (tubular adenoma). 6 mm polyp in the sigmoid colon removed by cold snare (tubular adenoma). Repeat colonoscopy in 6 months.    Discussion/Summary 6/2020 Called and reviewed recent biopsies - H. pylori. Large colon polyp tubular adenoma. Will start triple therapy. Side effects of abx reviewed. Patient will come for H. pylori breath test one month to confirm eradication - discussed how to prep for it. Advised family should be tested for H. pylori. Repeat colonoscopy in 6 months.    Discussion/Summary 7/2020 Called patient - she says she has been having more acid reflux, feels like she has a yeast infection and was feeling constipated. advised taking Pepcid in addition to PPI she is on, advised taking Monistat for yeast infection and stool softener for constipation. Call back if symptoms persist. She has two more days of triple of therapy.   Discussion/Summary 8/2020 Called and reviewed recent negative H. pylori - Advised Metamucil once a day for constipation. Colonoscopy in 6 months from June 2020.   Discussion/Summary 9/2020 Called patient - she reported having more acid reflux - now feeling better - advised Pepcid over the counter for reflux. She has no other complaints. Will see me for her colonoscopy given Hx of high risk adenoma.   Discussion/Summary Nov 2020 She says every time she eats she feels something in her throat - feels a lump in throat - advised pantoprazole 40 mg twice a day for 4 wks - she does not want an upper endoscopy - she will see me in office Colonoscopy on Dec 2020: Mild internal hemorrhoids otherwise normal exam to the cecum. Repeat colonoscopy in 3 years. CT angio chest on Jan 2021: Normal exam.    Discussion/Summary 5/2021 Called patient - she has been feeling constipated - no fevers, no abdominal pain pain - advised MiraLAX once a day - follow up in then office.   Office visit 6/2021:  She has been has bowel movement every day - sometimes 3 times a day - but small [place] - does not feel empty after a bowel movement - pushes and strains to have a bowel movement. Then will have abdominal pain after a bowel movement. Says she has been constipated since May of 2020 but lately has been getting worse a month. Sometimes she takes Metamucil - takes dairy - has used Dulcolax a few times but had abdominal pain and stopped taking. No melena and no hematochezia - says she does not look at her stools. Lately she has been nauseas if she is hungry. [place]ingle diagnosed yesterday - has not started prednisone, Valtrex yet - she will today. Offers no other complaint.

## 2024-04-11 ENCOUNTER — APPOINTMENT (OUTPATIENT)
Dept: RHEUMATOLOGY | Facility: CLINIC | Age: 54
End: 2024-04-11
Payer: COMMERCIAL

## 2024-04-11 ENCOUNTER — LABORATORY RESULT (OUTPATIENT)
Age: 54
End: 2024-04-11

## 2024-04-11 VITALS
OXYGEN SATURATION: 97 % | DIASTOLIC BLOOD PRESSURE: 80 MMHG | WEIGHT: 172 LBS | BODY MASS INDEX: 27.64 KG/M2 | HEART RATE: 71 BPM | SYSTOLIC BLOOD PRESSURE: 120 MMHG | HEIGHT: 66 IN | TEMPERATURE: 97.3 F

## 2024-04-11 DIAGNOSIS — M25.50 PAIN IN UNSPECIFIED JOINT: ICD-10-CM

## 2024-04-11 DIAGNOSIS — M54.9 DORSALGIA, UNSPECIFIED: ICD-10-CM

## 2024-04-11 PROCEDURE — 36415 COLL VENOUS BLD VENIPUNCTURE: CPT

## 2024-04-11 PROCEDURE — 99204 OFFICE O/P NEW MOD 45 MIN: CPT

## 2024-04-11 NOTE — REVIEW OF SYSTEMS
[Recent Weight Gain (___ Lbs)] : recent [unfilled] ~Ulb weight gain [Recent Weight Loss (___ Lbs)] : no recent weight loss [As Noted in HPI] : as noted in HPI [Arthralgias] : arthralgias [Joint Pain] : joint pain [Joint Swelling] : no joint swelling [Joint Stiffness] : joint stiffness [Negative] : Heme/Lymph

## 2024-04-11 NOTE — PHYSICAL EXAM
[General Appearance - Alert] : alert [General Appearance - In No Acute Distress] : in no acute distress [General Appearance - Well Developed] : well developed [General Appearance - Well-Appearing] : healthy appearing [Sclera] : the sclera and conjunctiva were normal [Extraocular Movements] : extraocular movements were intact [Outer Ear] : the ears and nose were normal in appearance [Exaggerated Use Of Accessory Muscles For Inspiration] : no accessory muscle use [Edema] : there was no peripheral edema [Musculoskeletal - Swelling] : no joint swelling seen [FreeTextEntry1] : no joint tenderness or synovitis noted; slight soft tissue swelling of b/l hand dorsum is noted, but not overlying joints.  [Skin Color & Pigmentation] : normal skin color and pigmentation [] : no rash [Skin Lesions] : no skin lesions [No Focal Deficits] : no focal deficits [Oriented To Time, Place, And Person] : oriented to person, place, and time [Affect] : the affect was normal [Mood] : the mood was normal

## 2024-04-11 NOTE — HISTORY OF PRESENT ILLNESS
[FreeTextEntry1] : 53F acute onset joint pain for past month, affecting all joints, b/l shoudlers, elbows, wrists, knees, ankles. Pain started in R. groin initially. No joint swelling (but notes generalized b/l hand and foot swelling) and no significant joint stiffness apart from b/l shoulders and back.   She initially tried Alleve x 7 days, did not help at all. Was given prednisone 30 mg x 3 days, 20 mg x 3 days, 10 mg for 4 days, then advised to stop.  After the prednisone was discontinued, joint pains recurred, but not as severe as initial.  No hx of tick bites, no recent MVA, no recent GI/ infections. No recent foreign travel.  No ocular inflammation. No eye pain, redness or blurry vision. No rashes. No oral or nasal ulcers. +Hair loss, she attributes it to menopause. Periods stopped about a year ago. Has gotten hot flashes since.  No hematuria. No hx of DVT or PE history. No dry eyes or dry mouth. No Raynauds.   No hx of psoriasis or IBD.  Family history: no known hx of autoimmune conditions.  [Weight Loss] : no weight loss [Malar Facial Rash] : no malar facial rash [Skin Lesions] : no lesions [Skin Nodules] : no skin nodules [Oral Ulcers] : no oral ulcers [Dry Mouth] : no dry mouth [Arthralgias] : arthralgias [Joint Swelling] : no joint swelling [Morning Stiffness] : no morning stiffness [Visual Changes] : no visual changes [Eye Pain] : no eye pain [Eye Redness] : no eye redness [Dry Eyes] : no dry eyes

## 2024-04-16 ENCOUNTER — RESULT REVIEW (OUTPATIENT)
Age: 54
End: 2024-04-16

## 2024-04-16 LAB
25(OH)D3 SERPL-MCNC: 34.8 NG/ML
ALBUMIN SERPL ELPH-MCNC: 4.1 G/DL
ALP BLD-CCNC: 93 U/L
ALT SERPL-CCNC: 19 U/L
ANA SER IF-ACNC: NEGATIVE
ANION GAP SERPL CALC-SCNC: 11 MMOL/L
APPEARANCE: CLEAR
AST SERPL-CCNC: 19 U/L
B BURGDOR AB SER-IMP: NEGATIVE
B BURGDOR IGG+IGM SER QL: 0.03 INDEX
B19V IGG SER QL IA: 2.43 INDEX
B19V IGG+IGM SER-IMP: NORMAL
B19V IGG+IGM SER-IMP: POSITIVE
B19V IGM FLD-ACNC: 0.12 INDEX
B19V IGM SER-ACNC: NEGATIVE
BILIRUB SERPL-MCNC: 0.4 MG/DL
BILIRUBIN URINE: NEGATIVE
BLOOD URINE: NEGATIVE
BUN SERPL-MCNC: 8 MG/DL
C3 SERPL-MCNC: 171 MG/DL
C4 SERPL-MCNC: 24 MG/DL
CALCIUM SERPL-MCNC: 9.4 MG/DL
CHLORIDE SERPL-SCNC: 101 MMOL/L
CO2 SERPL-SCNC: 25 MMOL/L
COLOR: YELLOW
CREAT SERPL-MCNC: 1.01 MG/DL
CREAT SPEC-SCNC: 138 MG/DL
CREAT/PROT UR: 0.1 RATIO
CRP SERPL-MCNC: <3 MG/L
DSDNA AB SER-ACNC: <1 IU/ML
EGFR: 67 ML/MIN/1.73M2
ENA RNP AB SER IA-ACNC: <0.2 AL
ENA SM AB SER IA-ACNC: <0.2 AL
ENA SS-A AB SER IA-ACNC: <0.2 AL
ENA SS-B AB SER IA-ACNC: <0.2 AL
ERYTHROCYTE [SEDIMENTATION RATE] IN BLOOD BY WESTERGREN METHOD: 19 MM/HR
G6PD SER-CCNC: 14 U/G HGB
GLUCOSE QUALITATIVE U: NEGATIVE MG/DL
GLUCOSE SERPL-MCNC: 98 MG/DL
HAV IGM SER QL: NONREACTIVE
HBV CORE IGM SER QL: NONREACTIVE
HBV SURFACE AG SER QL: NONREACTIVE
HCV AB SER QL: NONREACTIVE
HCV S/CO RATIO: 0.04 S/CO
KETONES URINE: NEGATIVE MG/DL
LEUKOCYTE ESTERASE URINE: ABNORMAL
NITRITE URINE: NEGATIVE
PH URINE: 6
POTASSIUM SERPL-SCNC: 4.2 MMOL/L
PROT SERPL-MCNC: 8.4 G/DL
PROT UR-MCNC: 14 MG/DL
PROTEIN URINE: NEGATIVE MG/DL
RHEUMATOID FACT SER QL: <10 IU/ML
SODIUM SERPL-SCNC: 138 MMOL/L
SPECIFIC GRAVITY URINE: 1.02
UROBILINOGEN URINE: 0.2 MG/DL

## 2024-05-23 ENCOUNTER — RESULT REVIEW (OUTPATIENT)
Age: 54
End: 2024-05-23

## 2024-05-23 ENCOUNTER — APPOINTMENT (OUTPATIENT)
Dept: ULTRASOUND IMAGING | Facility: CLINIC | Age: 54
End: 2024-05-23
Payer: COMMERCIAL

## 2024-05-23 ENCOUNTER — OUTPATIENT (OUTPATIENT)
Dept: OUTPATIENT SERVICES | Facility: HOSPITAL | Age: 54
LOS: 1 days | End: 2024-05-23
Payer: COMMERCIAL

## 2024-05-23 DIAGNOSIS — M25.50 PAIN IN UNSPECIFIED JOINT: ICD-10-CM

## 2024-05-23 DIAGNOSIS — M25.551 PAIN IN RIGHT HIP: ICD-10-CM

## 2024-05-23 PROCEDURE — 76881 US COMPL JOINT R-T W/IMG: CPT | Mod: 26,RT

## 2024-05-23 PROCEDURE — 76881 US COMPL JOINT R-T W/IMG: CPT | Mod: 26,76,LT

## 2024-05-23 PROCEDURE — 76881 US COMPL JOINT R-T W/IMG: CPT

## 2024-05-23 PROCEDURE — 76882 US LMTD JT/FCL EVL NVASC XTR: CPT

## 2024-05-23 PROCEDURE — 76881 US COMPL JOINT R-T W/IMG: CPT | Mod: 26,76,RT

## 2024-05-24 DIAGNOSIS — M67.814 OTHER SPECIFIED DISORDERS OF TENDON, RIGHT SHOULDER: ICD-10-CM

## 2024-05-24 DIAGNOSIS — M67.813 OTHER SPECIFIED DISORDERS OF TENDON, RIGHT SHOULDER: ICD-10-CM

## 2024-05-24 RX ORDER — NAPROXEN 500 MG/1
500 TABLET ORAL
Qty: 28 | Refills: 0 | Status: ACTIVE | COMMUNITY
Start: 2024-05-24 | End: 1900-01-01

## 2024-06-14 ENCOUNTER — APPOINTMENT (OUTPATIENT)
Dept: GASTROENTEROLOGY | Facility: HOSPITAL | Age: 54
End: 2024-06-14

## 2024-06-26 ENCOUNTER — APPOINTMENT (OUTPATIENT)
Dept: RHEUMATOLOGY | Facility: CLINIC | Age: 54
End: 2024-06-26

## 2024-08-09 ENCOUNTER — TRANSCRIPTION ENCOUNTER (OUTPATIENT)
Age: 54
End: 2024-08-09

## 2024-08-09 ENCOUNTER — RESULT REVIEW (OUTPATIENT)
Age: 54
End: 2024-08-09

## 2024-08-09 ENCOUNTER — APPOINTMENT (OUTPATIENT)
Dept: GASTROENTEROLOGY | Facility: HOSPITAL | Age: 54
End: 2024-08-09

## (undated) DEVICE — CATH IV SAFE BC BLU 22GAX1.0"

## (undated) DEVICE — CATH IV SAFE 24GX3/4

## (undated) DEVICE — SOL INJ LR 500ML

## (undated) DEVICE — GLV 7.5 ULTRAFREE MAX

## (undated) DEVICE — PACK IV START WITH CHG

## (undated) DEVICE — TRAY EPIDURAL SINGLE DOSE

## (undated) DEVICE — FORCEP ENDOJAW AGTR LC W NDL 2.8MM 230CM DISP

## (undated) DEVICE — STERIS DEFENDO 3-PIECE KIT (AIR/WATER, SUCTION & BIOPSY VALVES)

## (undated) DEVICE — NDL SPNL WHIT 22GX3.5IN